# Patient Record
Sex: FEMALE | Race: WHITE | NOT HISPANIC OR LATINO | Employment: FULL TIME | URBAN - METROPOLITAN AREA
[De-identification: names, ages, dates, MRNs, and addresses within clinical notes are randomized per-mention and may not be internally consistent; named-entity substitution may affect disease eponyms.]

---

## 2017-06-09 ENCOUNTER — GENERIC CONVERSION - ENCOUNTER (OUTPATIENT)
Dept: OTHER | Facility: OTHER | Age: 42
End: 2017-06-09

## 2017-06-09 DIAGNOSIS — R92.8 OTHER ABNORMAL AND INCONCLUSIVE FINDINGS ON DIAGNOSTIC IMAGING OF BREAST: ICD-10-CM

## 2017-06-13 ENCOUNTER — GENERIC CONVERSION - ENCOUNTER (OUTPATIENT)
Dept: OTHER | Facility: OTHER | Age: 42
End: 2017-06-13

## 2017-12-09 DIAGNOSIS — R92.8 OTHER ABNORMAL AND INCONCLUSIVE FINDINGS ON DIAGNOSTIC IMAGING OF BREAST: ICD-10-CM

## 2018-01-05 ENCOUNTER — GENERIC CONVERSION - ENCOUNTER (OUTPATIENT)
Dept: OTHER | Facility: OTHER | Age: 43
End: 2018-01-05

## 2018-01-10 NOTE — MISCELLANEOUS
Message   Recorded as Task   Date: 12/12/2016 08:56 AM, Created By: Leia Seaman   Task Name: Call Back   Assigned To: Mary Niño   Regarding Patient: Thamas Bumpers, Status: In Progress   Comment:    Ida Sosa - 12 Dec 2016 8:56 AM     TASK CREATED  Mammo shows small bilateral nodules - needs bilateral ultrasound  Jeanette Ramos - 12 Dec 2016 9:06 AM     TASK IN PROGRESS   Jeanette Ramos - 12 Dec 2016 9:08 AM     TASK EDITED  [facility]   Jeanette Ramos - 13 Dec 2016 8:59 AM     TASK EDITED  [facility]   Jeanette Ramos - 13 Dec 2016 9:04 AM     TASK EDITED  Spoke with pt, stated she went for a bilateral u/s on 12/09  Will await for results from that and call her back with them  Active Problems    1  Depression (311) (F32 9)   2  Dysmenorrhea (625 3) (N94 6)   3  Encounter for gynecological examination without abnormal finding (V72 31) (Z01 419)   4  Encounter for screening mammogram for malignant neoplasm of breast (V76 12)   (Z12 31)   5  Menorrhagia (626 2) (N92 0)   6  Poison ivy (692 6) (L23 7)   7  Screening for human papillomavirus (HPV) (V73 81) (Z11 51)    Current Meds   1  TraMADol HCl - 50 MG Oral Tablet; Therapy: (Recorded:[id number]) to Recorded   2  Wellbutrin  MG Oral Tablet Extended Release 12 Hour (BuPROPion HCl ER   (SR)); TAKE 1 TABLET DAILY AS DIRECTED; Therapy: [id number] to Recorded    Allergies    1   No Known Drug Allergies    Signatures   Electronically signed by : Tiesha Lawrence, ; Dec 13 2016  9:05AM EST                       (Author)

## 2018-01-11 NOTE — MISCELLANEOUS
Message   Recorded as Task   Date: 12/13/2016 11:12 AM, Created By: Samantha Kelly   Task Name: Call Back   Assigned To: Kait Shanks   Regarding Patient: Eula Art, Status: In Progress   Comment:    Ida Sosa - 13 Dec 2016 11:12 AM     TASK CREATED  breast ultrasound shows bilateral cysts  Rec  is for 6 month follow-up ultrasound  Could also offer breast specialist opinion if she is interested  Jeanette Ramos - 13 Dec 2016 11:13 AM     TASK IN PROGRESS   Jeanette Ramos - 13 Dec 2016 11:20 AM     TASK EDITED  Made pt aware  Active Problems    1  Depression (311) (F32 9)   2  Dysmenorrhea (625 3) (N94 6)   3  Encounter for gynecological examination without abnormal finding (V72 31) (Z01 419)   4  Encounter for screening mammogram for malignant neoplasm of breast (V76 12)   (Z12 31)   5  Menorrhagia (626 2) (N92 0)   6  Poison ivy (692 6) (L23 7)   7  Screening for human papillomavirus (HPV) (V73 81) (Z11 51)    Current Meds   1  TraMADol HCl - 50 MG Oral Tablet; Therapy: (Recorded:70Bxm6873) to Recorded   2  Wellbutrin  MG Oral Tablet Extended Release 12 Hour (BuPROPion HCl ER   (SR)); TAKE 1 TABLET DAILY AS DIRECTED; Therapy: [id number] to Recorded    Allergies    1   No Known Drug Allergies    Signatures   Electronically signed by : Jeanie Andrews, ; Dec 13 2016 11:20AM EST                       (Author)

## 2018-01-13 NOTE — MISCELLANEOUS
Message   Recorded as Task   Date: 06/14/2017 07:14 AM, Created By: Katalina Salinas   Task Name: Call Back   Assigned To: Yue Mann   Regarding Patient: Zoila Nelson, Status: Active   CommentFredi Hof - 14 Jun 2017 7:14 AM     TASK CREATED  breast US shows cysts  They recommended repeat ultrasound in six months - please let her know and send her a script  Thanks! [facility] - 11 Jun 2017 7:38 AM     TASK REASSIGNED: Previously Assigned To Cate Hamlin - 14 Jun 2017 8:31 AM     TASK EDITED  lm on cell,mailed order        Active Problems    1  Depression (311) (F32 9)   2  Dysmenorrhea (625 3) (N94 6)   3  Encounter for gynecological examination without abnormal finding (V72 31) (Z01 419)   4  Encounter for screening mammogram for malignant neoplasm of breast (V76 12)   (Z12 31)   5  Mammogram abnormal (793 80) (R92 8)   6  Menorrhagia (626 2) (N92 0)   7  Poison ivy (692 6) (L23 7)   8  Screening for human papillomavirus (HPV) (V73 81) (Z11 51)    Current Meds   1  TraMADol HCl - 50 MG Oral Tablet; Therapy: (Recorded:37Tts6802) to Recorded   2  Wellbutrin  MG Oral Tablet Extended Release 12 Hour (BuPROPion HCl ER   (SR)); TAKE 1 TABLET DAILY AS DIRECTED; Therapy: [id number] to Recorded    Allergies    1   No Known Drug Allergies    Signatures   Electronically signed by : Matthew Coles, ; Jun 14 2017  8:31AM EST                       (Author)

## 2018-01-16 NOTE — MISCELLANEOUS
Message   Recorded as Task   Date: 06/09/2017 12:33 PM, Created By: Belem Bautista   Task Name: Follow Up   Assigned To: Angel Ferrara   Regarding Patient: Rody Leavitt, Status: In Progress   CommentMelody Nims - 09 Jun 2017 12:33 PM     TASK CREATED  Patient needs a script for Follow up Mammogram scheduled for today @ 3pm   It needs to be faxed to [phone]  this mammo is a follow up from 6 month ago screening  It will be on both breasts  Please task back to me and I will call her that it was faxed  Yue Mann - 09 Jun 2017 12:35 PM     TASK IN PROGRESS   Leonora Block - 09 Jun 2017 12:45 PM     TASK EDITED  verified w/ [facility] its breast us that pt needs,order faxed        Active Problems    1  Depression (311) (F32 9)   2  Dysmenorrhea (625 3) (N94 6)   3  Encounter for gynecological examination without abnormal finding (V72 31) (Z01 419)   4  Encounter for screening mammogram for malignant neoplasm of breast (V76 12)   (Z12 31)   5  Mammogram abnormal (793 80) (R92 8)   6  Menorrhagia (626 2) (N92 0)   7  Poison ivy (692 6) (L23 7)   8  Screening for human papillomavirus (HPV) (V73 81) (Z11 51)    Current Meds   1  TraMADol HCl - 50 MG Oral Tablet; Therapy: (Recorded:69Rxn1497) to Recorded   2  Wellbutrin  MG Oral Tablet Extended Release 12 Hour (BuPROPion HCl ER   (SR)); TAKE 1 TABLET DAILY AS DIRECTED; Therapy: [id number] to Recorded    Allergies    1  No Known Drug Allergies    Plan  Mammogram abnormal    · *US BREAST LEFT LIMITED (DIAGNOSTIC); Status:Hold For - Scheduling,Retrospective  By Protocol Authorization; Requested ROBERTO:[id number];    · *US BREAST RIGHT LIMITED (DIAGNOSTIC); Status:Hold For -  Scheduling,Retrospective By Protocol Authorization;  Requested LFK:[id number];     Signatures   Electronically signed by : Cynthia Hooper, ; Jun 9 2017 12:45PM EST                       (Author)

## 2018-01-23 NOTE — MISCELLANEOUS
Message   Recorded as Task   Date: 01/04/2018 09:04 AM, Created By: Gini Oates   Task Name: Care Coordination   Assigned To: Jesse Cooper   Regarding Patient: Steen Dancer, Status: In Progress   Comment:    Chen Burnham - 04 Jan 2018 9:04 AM     TASK CREATED  Caller: Self; Care Coordination; [phone] (Home); [phone] (Work)  pt needs rx for Ebbevegen 92,  she wants to have this done before her yearly apt 1/18/18,  PLS FAX TO subha [phone],  pts # [phone] if needed   Mable Thompson - 04 Jan 2018 9:23 AM     TASK EDITED  Pt has been trying to conceive for 8 mos  Has a 10 yo with present   Wanted lab work, etc prior to seeing you so that she can discuss with you at [facility]  Ida Mcguire - 04 Jan 2018 1:01 PM     TASK REPLIED TO: Previously Assigned To Ida Sosa  I would refer her directly to infertility for evaluation  Markus 91 - 04 Jan 2018 1:09 PM     TASK IN PROGRESS   Maureen Paulino - 04 Jan 2018 1:13 PM     TASK EDITED  Left voicemail message today @ [phone] for Pt to call back office  Jeanette Ramos - 05 Jan 2018 8:32 AM     TASK EDITED  Spoke with pt - she is aware of W87 recommendations about going to infertility clinic for evaluation  She will contact her insurance to inquire about it  Active Problems    1  Depression (311) (F32 9)   2  Dysmenorrhea (625 3) (N94 6)   3  Encounter for gynecological examination without abnormal finding (V72 31) (Z01 419)   4  Encounter for screening mammogram for malignant neoplasm of breast (V76 12)   (Z12 31)   5  Mammogram abnormal (793 80) (R92 8)   6  Menorrhagia (626 2) (N92 0)   7  Poison ivy (692 6) (L23 7)   8  Screening for human papillomavirus (HPV) (V73 81) (Z11 51)    Current Meds   1  TraMADol HCl - 50 MG Oral Tablet; Therapy: (Recorded:97Wnf8680) to Recorded   2  Wellbutrin  MG Oral Tablet Extended Release 12 Hour (BuPROPion HCl ER   (SR)); TAKE 1 TABLET DAILY AS DIRECTED;    Therapy: [id number] to Recorded    Allergies    1   No Known Drug Allergies    Signatures   Electronically signed by : Sarah Amor, ; Jan 5 2018  8:32AM EST                       (Author)

## 2019-08-15 PROBLEM — R19.4 CHANGE IN BOWEL HABITS: Status: ACTIVE | Noted: 2019-08-15

## 2019-09-17 PROBLEM — S02.2XXA CLOSED FRACTURE OF NASAL SEPTUM: Status: ACTIVE | Noted: 2019-09-17

## 2019-09-17 PROBLEM — S02.2XXA CLOSED FRACTURE OF NASAL BONES: Status: ACTIVE | Noted: 2019-09-17

## 2020-01-15 ENCOUNTER — OFFICE VISIT (OUTPATIENT)
Dept: OBGYN CLINIC | Facility: CLINIC | Age: 45
End: 2020-01-15
Payer: COMMERCIAL

## 2020-01-15 VITALS
DIASTOLIC BLOOD PRESSURE: 86 MMHG | BODY MASS INDEX: 22.19 KG/M2 | SYSTOLIC BLOOD PRESSURE: 132 MMHG | WEIGHT: 120.6 LBS | HEIGHT: 62 IN

## 2020-01-15 DIAGNOSIS — Z01.419 ROUTINE GYNECOLOGICAL EXAMINATION: Primary | ICD-10-CM

## 2020-01-15 DIAGNOSIS — R14.0 BLOATING: ICD-10-CM

## 2020-01-15 DIAGNOSIS — N92.0 MENORRHAGIA WITH REGULAR CYCLE: ICD-10-CM

## 2020-01-15 DIAGNOSIS — Z12.31 ENCOUNTER FOR SCREENING MAMMOGRAM FOR BREAST CANCER: ICD-10-CM

## 2020-01-15 PROBLEM — R92.8 MAMMOGRAM ABNORMAL: Status: ACTIVE | Noted: 2017-06-09

## 2020-01-15 PROCEDURE — G0145 SCR C/V CYTO,THINLAYER,RESCR: HCPCS | Performed by: PHYSICIAN ASSISTANT

## 2020-01-15 PROCEDURE — S0610 ANNUAL GYNECOLOGICAL EXAMINA: HCPCS | Performed by: PHYSICIAN ASSISTANT

## 2020-01-15 PROCEDURE — 87624 HPV HI-RISK TYP POOLED RSLT: CPT | Performed by: PHYSICIAN ASSISTANT

## 2020-01-15 NOTE — PROGRESS NOTES
Assessment/Plan   Problem List Items Addressed This Visit        Other    Menorrhagia    Relevant Orders    US pelvis complete w transvaginal    Routine gynecological examination - Primary     Pap guidelines reviewed  Pap with HPV done today  Script for mammogram given  Reviewed continued bloating and mildly enlarged uterus on exam, will plan to get pelvic ultrasound to further evaluate  Return to office for annual or as needed  Relevant Orders    Liquid-based pap, screening      Other Visit Diagnoses     Encounter for screening mammogram for breast cancer        Relevant Orders    Mammo screening bilateral w 3d & cad    Bloating        Relevant Orders    US pelvis complete w transvaginal          Subjective:     Patient ID: Romelia Douglas is a 40 y o  y o  female  HPI  41 yo seen for annual exam  Patient reports menses regular, heavy at times changing tampon every 3-4 hours on the heaviest days  Reports only lasts 3 days  Cramping tolerable, had been on Tramadol for it in the past, now just deals with it  Reports recently experiencing a lot of lower pelvic bloating, had colonoscopy which was normal, had diverticulitis soon after and was treated with antibiotics, caused yeast infection, treated OTC, symptoms improved  Still bloating, occasional LLQ pain, not relieved by bowel movement  Using contraceptive film for birth control, declines another method at this time  Denies bladder issues  Last pap: 5/12/2015 NILM (-)HRHPV  Patient reports hx of 1 abnormal pap in the past, normal since  Last mammogram: 12/6/2016 reports BIRADS 0, needed additional testing which came back negative  Last colonoscopy: 9/2019  The following portions of the patient's history were reviewed and updated as appropriate:   She  has a past medical history of Anxiety, Obsessive compulsive disorder, and PMDD (premenstrual dysphoric disorder)    She   Patient Active Problem List    Diagnosis Date Noted    Routine gynecological examination 01/15/2020    Closed fracture of nasal bones 2019    Closed fracture of nasal septum 2019    Change in bowel habits 08/15/2019    Mammogram abnormal 2017    Poison ivy 2014    Depression 2013    Dysmenorrhea 2013    Menorrhagia 2013     She  has a past surgical history that includes Ankle surgery; Cervical biopsy w/ loop electrode excision (); Colposcopy (2003); Mouth surgery; and Other surgical history  Her family history includes Coronary artery disease in her paternal grandmother; Heart disease in her paternal grandmother; Other in her father; Stroke in her paternal grandmother  She  reports that she has never smoked  She has never used smokeless tobacco  She reports that she drinks alcohol  She reports that she does not use drugs  Current Outpatient Medications   Medication Sig Dispense Refill    ALPRAZolam (XANAX) 0 5 mg tablet TK 1 T PO Q 8  H PRN  1    WELLBUTRIN  MG 24 hr tablet TK 1 T PO QD  5     No current facility-administered medications for this visit  She is allergic to iodinated diagnostic agents       Menstrual History:  OB History        1    Para   1    Term   1            AB        Living   1       SAB        TAB        Ectopic        Multiple        Live Births   1                Menarche age: 15  No LMP recorded  Period Cycle (Days): 24  Period Duration (Days): 3  Period Pattern: Regular  Menstrual Flow: Moderate  Dysmenorrhea: None      Review of Systems   Constitutional: Negative for fatigue, fever and unexpected weight change  HENT: Negative for dental problem and sinus pressure  Eyes: Negative for visual disturbance  Respiratory: Negative for cough, shortness of breath and wheezing  Cardiovascular: Negative for chest pain  Gastrointestinal: Negative for abdominal pain, blood in stool, constipation, diarrhea, nausea and vomiting     Endocrine: Negative for polydipsia  Genitourinary: Negative for difficulty urinating, dyspareunia, dysuria, frequency, hematuria, pelvic pain and urgency  Musculoskeletal: Negative for arthralgias and back pain  Neurological: Negative for dizziness, seizures, light-headedness and headaches  Psychiatric/Behavioral: Negative for suicidal ideas  The patient is not nervous/anxious  Objective:  Vitals:    01/15/20 1529   BP: 132/86   BP Location: Left arm   Patient Position: Sitting   Cuff Size: Standard   Weight: 54 7 kg (120 lb 9 6 oz)   Height: 5' 2" (1 575 m)      Physical Exam   Constitutional: She is oriented to person, place, and time  She appears well-developed and well-nourished  Genitourinary: Rectum normal and vagina normal  Pelvic exam was performed with patient supine  There is no rash, tenderness, lesion, injury or Bartholin's cyst on the right labia  There is no rash, tenderness, lesion, injury or Bartholin's cyst on the left labia  Vagina exhibits no lesion  No erythema, tenderness or bleeding in the vagina  No signs of injury around the vagina  No vaginal discharge found  Right adnexum does not display mass, does not display tenderness and does not display fullness  Left adnexum does not display mass, does not display tenderness and does not display fullness  Cervix does not exhibit motion tenderness, lesion or discharge  Uterus is enlarged (12-13 wk uterus)  Uterus is not tender, exhibiting a mass, irregular (is regular) or mobile  Rectal exam shows no external hemorrhoid, no internal hemorrhoid, no fissure, no mass, no tenderness, anal tone normal and guaiac negative stool  HENT:   Head: Normocephalic and atraumatic  Neck: No thyromegaly present  Cardiovascular: Normal rate, regular rhythm and normal heart sounds  Exam reveals no gallop and no friction rub  No murmur heard  Pulmonary/Chest: Effort normal and breath sounds normal  No respiratory distress  She has no wheezes   Right breast exhibits no inverted nipple, no mass, no nipple discharge, no skin change and no tenderness  Left breast exhibits no inverted nipple, no mass, no nipple discharge, no skin change and no tenderness  No breast swelling, tenderness, discharge or bleeding  Breasts are symmetrical    Abdominal: Soft  She exhibits no distension and no mass  There is no tenderness  There is no rebound and no guarding  No hernia  Lymphadenopathy:     She has no cervical adenopathy  Right: No inguinal adenopathy present  Left: No inguinal adenopathy present  Neurological: She is alert and oriented to person, place, and time  Skin: Skin is warm and dry  Psychiatric: She has a normal mood and affect   Her behavior is normal

## 2020-01-15 NOTE — ASSESSMENT & PLAN NOTE
Pap guidelines reviewed  Pap with HPV done today  Script for mammogram given  Reviewed continued bloating and mildly enlarged uterus on exam, will plan to get pelvic ultrasound to further evaluate  Return to office for annual or as needed

## 2020-01-16 LAB
HPV HR 12 DNA CVX QL NAA+PROBE: NEGATIVE
HPV16 DNA CVX QL NAA+PROBE: NEGATIVE
HPV18 DNA CVX QL NAA+PROBE: NEGATIVE

## 2020-01-20 LAB
LAB AP GYN PRIMARY INTERPRETATION: NORMAL
LAB AP LMP: NORMAL
Lab: NORMAL

## 2020-01-24 DIAGNOSIS — Z12.31 ENCOUNTER FOR SCREENING MAMMOGRAM FOR BREAST CANCER: ICD-10-CM

## 2020-01-28 ENCOUNTER — HOSPITAL ENCOUNTER (OUTPATIENT)
Dept: ULTRASOUND IMAGING | Facility: HOSPITAL | Age: 45
Discharge: HOME | End: 2020-01-28
Attending: PHYSICIAN ASSISTANT
Payer: COMMERCIAL

## 2020-01-28 DIAGNOSIS — N92.0 MENORRHAGIA WITH REGULAR CYCLE: ICD-10-CM

## 2020-01-28 DIAGNOSIS — R14.0 BLOATING: ICD-10-CM

## 2020-01-28 PROCEDURE — 76830 TRANSVAGINAL US NON-OB: CPT

## 2020-01-28 PROCEDURE — 76856 US EXAM PELVIC COMPLETE: CPT

## 2020-02-04 ENCOUNTER — TELEPHONE (OUTPATIENT)
Dept: OBGYN CLINIC | Facility: CLINIC | Age: 45
End: 2020-02-04

## 2020-02-04 DIAGNOSIS — D25.2 INTRAMURAL AND SUBSEROUS LEIOMYOMA OF UTERUS: Primary | ICD-10-CM

## 2020-02-04 DIAGNOSIS — D25.1 INTRAMURAL AND SUBSEROUS LEIOMYOMA OF UTERUS: Primary | ICD-10-CM

## 2020-02-19 ENCOUNTER — TELEPHONE (OUTPATIENT)
Dept: OBGYN CLINIC | Facility: CLINIC | Age: 45
End: 2020-02-19

## 2020-02-19 DIAGNOSIS — B37.3 YEAST VAGINITIS: Primary | ICD-10-CM

## 2020-02-19 RX ORDER — FLUCONAZOLE 150 MG/1
150 TABLET ORAL EVERY OTHER DAY
Qty: 2 TABLET | Refills: 0 | Status: SHIPPED | OUTPATIENT
Start: 2020-02-19 | End: 2020-02-22

## 2021-07-21 ENCOUNTER — ANNUAL EXAM (OUTPATIENT)
Dept: OBGYN CLINIC | Facility: CLINIC | Age: 46
End: 2021-07-21
Payer: COMMERCIAL

## 2021-07-21 VITALS
SYSTOLIC BLOOD PRESSURE: 108 MMHG | BODY MASS INDEX: 21.35 KG/M2 | HEIGHT: 62 IN | WEIGHT: 116 LBS | DIASTOLIC BLOOD PRESSURE: 74 MMHG

## 2021-07-21 DIAGNOSIS — Z01.419 GYNECOLOGIC EXAM NORMAL: Primary | ICD-10-CM

## 2021-07-21 DIAGNOSIS — Z12.31 ENCOUNTER FOR SCREENING MAMMOGRAM FOR MALIGNANT NEOPLASM OF BREAST: ICD-10-CM

## 2021-07-21 DIAGNOSIS — B37.9 YEAST INFECTION: ICD-10-CM

## 2021-07-21 DIAGNOSIS — Z13.220 SCREENING FOR CHOLESTEROL LEVEL: ICD-10-CM

## 2021-07-21 DIAGNOSIS — D25.9 UTERINE LEIOMYOMA, UNSPECIFIED LOCATION: ICD-10-CM

## 2021-07-21 DIAGNOSIS — N92.0 MENORRHAGIA WITH REGULAR CYCLE: ICD-10-CM

## 2021-07-21 DIAGNOSIS — R53.83 FATIGUE, UNSPECIFIED TYPE: ICD-10-CM

## 2021-07-21 DIAGNOSIS — N92.6 IRREGULAR MENSES: ICD-10-CM

## 2021-07-21 PROCEDURE — S0612 ANNUAL GYNECOLOGICAL EXAMINA: HCPCS | Performed by: PHYSICIAN ASSISTANT

## 2021-07-21 RX ORDER — FLUCONAZOLE 150 MG/1
150 TABLET ORAL EVERY OTHER DAY
Qty: 2 TABLET | Refills: 0 | Status: SHIPPED | OUTPATIENT
Start: 2021-07-21 | End: 2021-07-24

## 2021-07-21 NOTE — ASSESSMENT & PLAN NOTE
Pap guidelines reviewed  Pap deferred secondary to negative pap and HPV in 2020 in this low risk patient  Reviewed dysmenorrhea and menorrhagia in length  Will plan to get repeat ultrasound to evaluate uterine fibroids  Will also plan blood work  Reviewed option of hysterectomy  Patient will consider  Reviewed persistent yeast infection  Likely caused by wet swim suits and increased sugar in diet  Will plan Diflucan 150mg x 2 doses  For prevention: Recommend acidophilus or yogurt daily, avoid irritating soaps or lotions, no tight fitted pants or underwear, avoid bubble baths, do not stay in wet swimsuit  Script for mammogram given  Return to office for annual or as needed

## 2021-08-04 ENCOUNTER — TELEPHONE (OUTPATIENT)
Dept: OBGYN CLINIC | Facility: CLINIC | Age: 46
End: 2021-08-04

## 2021-08-06 ENCOUNTER — HOSPITAL ENCOUNTER (OUTPATIENT)
Dept: ULTRASOUND IMAGING | Facility: HOSPITAL | Age: 46
Discharge: HOME | End: 2021-08-06
Attending: PHYSICIAN ASSISTANT
Payer: COMMERCIAL

## 2021-08-06 DIAGNOSIS — D25.9 UTERINE LEIOMYOMA, UNSPECIFIED LOCATION: ICD-10-CM

## 2021-08-06 PROCEDURE — 76856 US EXAM PELVIC COMPLETE: CPT

## 2021-08-06 PROCEDURE — 76830 TRANSVAGINAL US NON-OB: CPT

## 2021-08-07 LAB
ALBUMIN SERPL-MCNC: 4.3 G/DL (ref 3.6–5.1)
ALBUMIN/GLOB SERPL: 1.9 (CALC) (ref 1–2.5)
ALP SERPL-CCNC: 48 U/L (ref 31–125)
ALT SERPL-CCNC: 12 U/L (ref 6–29)
AST SERPL-CCNC: 13 U/L (ref 10–35)
BASOPHILS # BLD AUTO: 62 CELLS/UL (ref 0–200)
BASOPHILS NFR BLD AUTO: 2.2 %
BILIRUB SERPL-MCNC: 0.8 MG/DL (ref 0.2–1.2)
BUN SERPL-MCNC: 11 MG/DL (ref 7–25)
BUN/CREAT SERPL: NORMAL (CALC) (ref 6–22)
CALCIUM SERPL-MCNC: 9.3 MG/DL (ref 8.6–10.2)
CHLORIDE SERPL-SCNC: 102 MMOL/L (ref 98–110)
CHOLEST SERPL-MCNC: 195 MG/DL
CHOLEST/HDLC SERPL: 2.5 (CALC)
CO2 SERPL-SCNC: 28 MMOL/L (ref 20–32)
CREAT SERPL-MCNC: 0.66 MG/DL (ref 0.5–1.1)
EOSINOPHIL # BLD AUTO: 232 CELLS/UL (ref 15–500)
EOSINOPHIL NFR BLD AUTO: 8.3 %
ERYTHROCYTE [DISTWIDTH] IN BLOOD BY AUTOMATED COUNT: 12.6 % (ref 11–15)
GLOBULIN SER CALC-MCNC: 2.3 G/DL (CALC) (ref 1.9–3.7)
GLUCOSE SERPL-MCNC: 78 MG/DL (ref 65–99)
HCT VFR BLD AUTO: 42.9 % (ref 35–45)
HDLC SERPL-MCNC: 78 MG/DL
HGB BLD-MCNC: 13.8 G/DL (ref 11.7–15.5)
LDLC SERPL CALC-MCNC: 103 MG/DL (CALC)
LYMPHOCYTES # BLD AUTO: 994 CELLS/UL (ref 850–3900)
LYMPHOCYTES NFR BLD AUTO: 35.5 %
MCH RBC QN AUTO: 32.2 PG (ref 27–33)
MCHC RBC AUTO-ENTMCNC: 32.2 G/DL (ref 32–36)
MCV RBC AUTO: 100.2 FL (ref 80–100)
MONOCYTES # BLD AUTO: 224 CELLS/UL (ref 200–950)
MONOCYTES NFR BLD AUTO: 8 %
NEUTROPHILS # BLD AUTO: 1288 CELLS/UL (ref 1500–7800)
NEUTROPHILS NFR BLD AUTO: 46 %
NONHDLC SERPL-MCNC: 117 MG/DL (CALC)
PLATELET # BLD AUTO: 207 THOUSAND/UL (ref 140–400)
PMV BLD REES-ECKER: 11.6 FL (ref 7.5–12.5)
POTASSIUM SERPL-SCNC: 4.5 MMOL/L (ref 3.5–5.3)
PROT SERPL-MCNC: 6.6 G/DL (ref 6.1–8.1)
RBC # BLD AUTO: 4.28 MILLION/UL (ref 3.8–5.1)
REF LAB TEST NAME: NORMAL
REF LAB TEST: NORMAL
SL AMB CLIENT CONTACT: NORMAL
SL AMB EGFR AFRICAN AMERICAN: 124 ML/MIN/1.73M2
SL AMB EGFR NON AFRICAN AMERICAN: 107 ML/MIN/1.73M2
SODIUM SERPL-SCNC: 139 MMOL/L (ref 135–146)
T4 FREE SERPL-MCNC: 1.2 NG/DL (ref 0.8–1.8)
TRIGL SERPL-MCNC: 59 MG/DL
TSH SERPL-ACNC: 1.56 MIU/L
WBC # BLD AUTO: 2.8 THOUSAND/UL (ref 3.8–10.8)

## 2021-11-01 ENCOUNTER — OFFICE VISIT (OUTPATIENT)
Dept: OBGYN CLINIC | Facility: CLINIC | Age: 46
End: 2021-11-01
Payer: COMMERCIAL

## 2021-11-01 VITALS
SYSTOLIC BLOOD PRESSURE: 112 MMHG | HEIGHT: 62 IN | BODY MASS INDEX: 21.9 KG/M2 | WEIGHT: 119 LBS | DIASTOLIC BLOOD PRESSURE: 76 MMHG

## 2021-11-01 DIAGNOSIS — D25.1 INTRAMURAL LEIOMYOMA OF UTERUS: Primary | ICD-10-CM

## 2021-11-01 PROCEDURE — 99213 OFFICE O/P EST LOW 20 MIN: CPT | Performed by: OBSTETRICS & GYNECOLOGY

## 2021-12-15 ENCOUNTER — TELEPHONE (OUTPATIENT)
Dept: OBGYN CLINIC | Facility: CLINIC | Age: 46
End: 2021-12-15

## 2021-12-21 ENCOUNTER — TELEPHONE (OUTPATIENT)
Dept: OBGYN CLINIC | Facility: CLINIC | Age: 46
End: 2021-12-21

## 2021-12-23 ENCOUNTER — PREP FOR PROCEDURE (OUTPATIENT)
Dept: OBGYN CLINIC | Facility: CLINIC | Age: 46
End: 2021-12-23

## 2021-12-23 DIAGNOSIS — D25.9 FIBROID UTERUS: Primary | ICD-10-CM

## 2021-12-23 DIAGNOSIS — R10.2 PELVIC PAIN IN FEMALE: ICD-10-CM

## 2022-01-13 LAB
ABO GROUP BLD: NORMAL
BASOPHILS # BLD AUTO: 78 CELLS/UL (ref 0–200)
BASOPHILS NFR BLD AUTO: 1.7 %
BLD GP AB SCN SERPL QL: NORMAL
EOSINOPHIL # BLD AUTO: 239 CELLS/UL (ref 15–500)
EOSINOPHIL NFR BLD AUTO: 5.2 %
ERYTHROCYTE [DISTWIDTH] IN BLOOD BY AUTOMATED COUNT: 12.2 % (ref 11–15)
HCT VFR BLD AUTO: 38.4 % (ref 35–45)
HGB BLD-MCNC: 13.1 G/DL (ref 11.7–15.5)
LYMPHOCYTES # BLD AUTO: 1104 CELLS/UL (ref 850–3900)
LYMPHOCYTES NFR BLD AUTO: 24 %
MCH RBC QN AUTO: 31.3 PG (ref 27–33)
MCHC RBC AUTO-ENTMCNC: 34.1 G/DL (ref 32–36)
MCV RBC AUTO: 91.6 FL (ref 80–100)
MONOCYTES # BLD AUTO: 331 CELLS/UL (ref 200–950)
MONOCYTES NFR BLD AUTO: 7.2 %
NEUTROPHILS # BLD AUTO: 2847 CELLS/UL (ref 1500–7800)
NEUTROPHILS NFR BLD AUTO: 61.9 %
PLATELET # BLD AUTO: 212 THOUSAND/UL (ref 140–400)
PMV BLD REES-ECKER: 12.3 FL (ref 7.5–12.5)
RBC # BLD AUTO: 4.19 MILLION/UL (ref 3.8–5.1)
RH BLD: NORMAL
WBC # BLD AUTO: 4.6 THOUSAND/UL (ref 3.8–10.8)

## 2022-02-03 NOTE — PRE-PROCEDURE INSTRUCTIONS
My Surgical Experience    The following information was developed to assist you to prepare for your operation  What do I need to do before coming to the hospital?   Arrange for a responsible person to drive you to and from the hospital    Arrange care for your children at home  Children are not allowed in the recovery areas of the hospital   Plan to wear clothing that is easy to put on and take off  If you are having shoulder surgery, wear a shirt that buttons or zippers in the front  Bathing  o Shower the evening before and the morning of your surgery with an antibacterial soap  Please refer to the Pre Op Showering Instructions for Surgery Patients Sheet   o Remove nail polish and all body piercing jewelry  o Do not shave any body part for at least 24 hours before surgery-this includes face, arms, legs and upper body  Food  o Nothing to eat or drink after midnight the night before your surgery  This includes candy and chewing gum  o Exception: If your surgery is after 12:00pm (noon), you may have clear liquids such as 7-Up®, ginger ale, apple or cranberry juice, Jell-O®, water, or clear broth until 8:00 am  o Do not drink milk or juice with pulp on the morning before surgery  o Do not drink alcohol 24 hours before surgery  Medicine  o Follow instructions you received from your surgeon about which medicines you may take on the day of surgery  o If instructed to take medicine on the morning of surgery, take pills with just a small sip of water  Call your prescribing doctor for specific infroamtion on what to do if you take insulin    What should I bring to the hospital?    Bring:  Nany Tirado or a walker, if you have them, for foot or knee surgery   A list of the daily medicines, vitamins, minerals, herbals and nutritional supplements you take   Include the dosages of medicines and the time you take them each day   Glasses, dentures or hearing aids   Minimal clothing; you will be wearing hospital sleepwear   Photo ID; required to verify your identity   If you have a Living Will or Power of , bring a copy of the documents   If you have an ostomy, bring an extra pouch and any supplies you use    Do not bring   Medicines or inhalers   Money, valuables or jewelry    What other information should I know about the day of surgery?  Notify your surgeons if you develop a cold, sore throat, cough, fever, rash or any other illness   Report to the Ambulatory Surgical/Same Day Surgery Unit   You will be instructed to stop at Registration only if you have not been pre-registered   Inform your  fi they do not stay that they will be asked by the staff to leave a phone number where they can be reached   Be available to be reached before surgery  In the event the operating room schedule changes, you may be asked to come in earlier or later than expected    *It is important to tell your doctor and others involved in your health care if you are taking or have been taking any non-prescription drugs, vitamins, minerals, herbals or other nutritional supplements  Any of these may interact with some food or medicines and cause a reaction      Pre-Surgery Instructions:   Medication Instructions    ALPRAZolam (XANAX) 0 5 mg tablet Instructed patient per Anesthesia Guidelines   WELLBUTRIN  MG 24 hr tablet Instructed patient per Anesthesia Guidelines  To take wellbutrin and xanax(prn) a m  of surgery

## 2022-02-04 PROCEDURE — NC001 PR NO CHARGE: Performed by: OBSTETRICS & GYNECOLOGY

## 2022-02-04 NOTE — H&P
Jimmy Kendrick is a 27-year-old  with regular heavy menses and severe dysmenorrhea for at least the last 3 years  Patient has had also had increased pelvic pressure increased frequency of emptying bladder  Fully counseled patient to laparoscopic-assisted vaginal hysterectomy bilateral salpingectomy reviewed surgery, expected course, risks and benefits, surgical instructions  Patient reports understanding consent desires to proceed  Gyn history  Contraception:  Vasectomy  Last Pap 2020 normal  Last mammogram 2021 normal    Past Medical History:   Diagnosis Date    Abnormal Pap smear of cervix     Anxiety     Depression     Fibroid     Obsessive compulsive disorder     RESOLVED 14    PMDD (premenstrual dysphoric disorder)      Past Surgical History:   Procedure Laterality Date    ANKLE SURGERY      CERVICAL BIOPSY  W/ LOOP ELECTRODE EXCISION      COLPOSCOPY  2003    CERVIX WITH BIOPSY(S)    FRACTURE SURGERY      MOUTH SURGERY      TOOTH EXTRACTION    OTHER SURGICAL HISTORY      ENDOCERVICAL CURETTAGE (NOT D&C)     OB History        1    Para   1    Term   1            AB        Living   1       SAB        IAB        Ectopic        Multiple        Live Births   1               Current Outpatient Medications   Medication Instructions    ALPRAZolam (XANAX) 0 5 mg tablet TK 1 T PO Q 8  H PRN    WELLBUTRIN  MG 24 hr tablet TK 1 T PO QD     Allergies   Allergen Reactions    Iodinated Diagnostic Agents      Social History     Tobacco Use    Smoking status: Never Smoker    Smokeless tobacco: Never Used   Vaping Use    Vaping Use: Never used   Substance Use Topics    Alcohol use:  Yes     Alcohol/week: 4 0 standard drinks     Types: 4 Cans of beer per week     Comment: SOCIAL    Drug use: Never     Physical exam  Height 5 ft 2 in, weight 119 lb (54 kg), BMI 21 77, /76  Head:  Normocephalic atraumatic,  Lungs:  Clear to auscultation bilaterally  Heart:  Regular rate rhythm S1-S2  Abdomen:  Soft nontender positive bowel sounds organomegaly  Pelvic:  Normal external female genitalia, normal vagina without discharge, no cervical motion tenderness, no adnexal masses or tenderness, 14 week size fibroid uterus mobile palpable slightly above pelvic brim  Extremities:  Normal warm well perfused no cyanosis clubbing or edema    Assessment:  43-year-old  with symptomatic fibroid uterus heavy menses and pressure symptoms      Plan LAVH bilateral salpingectomy

## 2022-02-08 ENCOUNTER — ANESTHESIA EVENT (OUTPATIENT)
Dept: PERIOP | Facility: HOSPITAL | Age: 47
End: 2022-02-08
Payer: COMMERCIAL

## 2022-02-09 ENCOUNTER — ANESTHESIA (OUTPATIENT)
Dept: PERIOP | Facility: HOSPITAL | Age: 47
End: 2022-02-09
Payer: COMMERCIAL

## 2022-02-09 ENCOUNTER — HOSPITAL ENCOUNTER (OUTPATIENT)
Facility: HOSPITAL | Age: 47
Setting detail: OUTPATIENT SURGERY
Discharge: HOME | End: 2022-02-09
Attending: OBSTETRICS & GYNECOLOGY | Admitting: OBSTETRICS & GYNECOLOGY
Payer: COMMERCIAL

## 2022-02-09 VITALS
SYSTOLIC BLOOD PRESSURE: 135 MMHG | OXYGEN SATURATION: 98 % | RESPIRATION RATE: 18 BRPM | WEIGHT: 113 LBS | TEMPERATURE: 97.9 F | HEIGHT: 62 IN | DIASTOLIC BLOOD PRESSURE: 63 MMHG | HEART RATE: 67 BPM | BODY MASS INDEX: 20.8 KG/M2

## 2022-02-09 DIAGNOSIS — D25.9 FIBROID UTERUS: ICD-10-CM

## 2022-02-09 DIAGNOSIS — R10.2 PELVIC PAIN IN FEMALE: ICD-10-CM

## 2022-02-09 LAB
EXT PREGNANCY TEST URINE: NEGATIVE
EXT. CONTROL: NORMAL

## 2022-02-09 PROCEDURE — 88307 TISSUE EXAM BY PATHOLOGIST: CPT | Performed by: PATHOLOGY

## 2022-02-09 PROCEDURE — 81025 URINE PREGNANCY TEST: CPT | Performed by: ANESTHESIOLOGY

## 2022-02-09 PROCEDURE — 58554 LAPARO-VAG HYST W/T/O COMPL: CPT | Performed by: OBSTETRICS & GYNECOLOGY

## 2022-02-09 RX ORDER — MIDAZOLAM HYDROCHLORIDE 2 MG/2ML
INJECTION, SOLUTION INTRAMUSCULAR; INTRAVENOUS AS NEEDED
Status: DISCONTINUED | OUTPATIENT
Start: 2022-02-09 | End: 2022-02-09

## 2022-02-09 RX ORDER — SODIUM CHLORIDE, SODIUM LACTATE, POTASSIUM CHLORIDE, CALCIUM CHLORIDE 600; 310; 30; 20 MG/100ML; MG/100ML; MG/100ML; MG/100ML
125 INJECTION, SOLUTION INTRAVENOUS CONTINUOUS
Status: DISCONTINUED | OUTPATIENT
Start: 2022-02-09 | End: 2022-02-09 | Stop reason: HOSPADM

## 2022-02-09 RX ORDER — ONDANSETRON 2 MG/ML
4 INJECTION INTRAMUSCULAR; INTRAVENOUS EVERY 6 HOURS PRN
Status: DISCONTINUED | OUTPATIENT
Start: 2022-02-09 | End: 2022-02-09 | Stop reason: HOSPADM

## 2022-02-09 RX ORDER — MAGNESIUM HYDROXIDE 1200 MG/15ML
LIQUID ORAL AS NEEDED
Status: DISCONTINUED | OUTPATIENT
Start: 2022-02-09 | End: 2022-02-09 | Stop reason: HOSPADM

## 2022-02-09 RX ORDER — ONDANSETRON 2 MG/ML
INJECTION INTRAMUSCULAR; INTRAVENOUS AS NEEDED
Status: DISCONTINUED | OUTPATIENT
Start: 2022-02-09 | End: 2022-02-09

## 2022-02-09 RX ORDER — METRONIDAZOLE 7.5 MG/G
GEL VAGINAL AS NEEDED
Status: DISCONTINUED | OUTPATIENT
Start: 2022-02-09 | End: 2022-02-09 | Stop reason: HOSPADM

## 2022-02-09 RX ORDER — DEXAMETHASONE SODIUM PHOSPHATE 4 MG/ML
INJECTION, SOLUTION INTRA-ARTICULAR; INTRALESIONAL; INTRAMUSCULAR; INTRAVENOUS; SOFT TISSUE AS NEEDED
Status: DISCONTINUED | OUTPATIENT
Start: 2022-02-09 | End: 2022-02-09

## 2022-02-09 RX ORDER — CEFAZOLIN SODIUM 1 G/50ML
1000 SOLUTION INTRAVENOUS ONCE
Status: DISCONTINUED | OUTPATIENT
Start: 2022-02-09 | End: 2022-02-09 | Stop reason: HOSPADM

## 2022-02-09 RX ORDER — ONDANSETRON 2 MG/ML
4 INJECTION INTRAMUSCULAR; INTRAVENOUS ONCE AS NEEDED
Status: DISCONTINUED | OUTPATIENT
Start: 2022-02-09 | End: 2022-02-09 | Stop reason: HOSPADM

## 2022-02-09 RX ORDER — ACETAMINOPHEN 325 MG/1
975 TABLET ORAL EVERY 6 HOURS PRN
Status: DISCONTINUED | OUTPATIENT
Start: 2022-02-09 | End: 2022-02-09 | Stop reason: HOSPADM

## 2022-02-09 RX ORDER — SODIUM CHLORIDE, SODIUM LACTATE, POTASSIUM CHLORIDE, CALCIUM CHLORIDE 600; 310; 30; 20 MG/100ML; MG/100ML; MG/100ML; MG/100ML
125 INJECTION, SOLUTION INTRAVENOUS CONTINUOUS
Status: DISCONTINUED | OUTPATIENT
Start: 2022-02-09 | End: 2022-02-09

## 2022-02-09 RX ORDER — OXYCODONE HYDROCHLORIDE 5 MG/1
5 TABLET ORAL EVERY 4 HOURS PRN
Status: DISCONTINUED | OUTPATIENT
Start: 2022-02-09 | End: 2022-02-09 | Stop reason: HOSPADM

## 2022-02-09 RX ORDER — FENTANYL CITRATE 50 UG/ML
INJECTION, SOLUTION INTRAMUSCULAR; INTRAVENOUS AS NEEDED
Status: DISCONTINUED | OUTPATIENT
Start: 2022-02-09 | End: 2022-02-09

## 2022-02-09 RX ORDER — EPHEDRINE SULFATE 50 MG/ML
INJECTION INTRAVENOUS AS NEEDED
Status: DISCONTINUED | OUTPATIENT
Start: 2022-02-09 | End: 2022-02-09

## 2022-02-09 RX ORDER — IBUPROFEN 600 MG/1
600 TABLET ORAL EVERY 6 HOURS PRN
Status: DISCONTINUED | OUTPATIENT
Start: 2022-02-09 | End: 2022-02-09 | Stop reason: HOSPADM

## 2022-02-09 RX ORDER — CEFAZOLIN SODIUM 1 G/50ML
SOLUTION INTRAVENOUS AS NEEDED
Status: DISCONTINUED | OUTPATIENT
Start: 2022-02-09 | End: 2022-02-09

## 2022-02-09 RX ORDER — SODIUM CHLORIDE 9 MG/ML
INJECTION, SOLUTION INTRAVENOUS AS NEEDED
Status: DISCONTINUED | OUTPATIENT
Start: 2022-02-09 | End: 2022-02-09 | Stop reason: HOSPADM

## 2022-02-09 RX ORDER — HYDROMORPHONE HCL/PF 1 MG/ML
SYRINGE (ML) INJECTION AS NEEDED
Status: DISCONTINUED | OUTPATIENT
Start: 2022-02-09 | End: 2022-02-09

## 2022-02-09 RX ORDER — LIDOCAINE HYDROCHLORIDE 10 MG/ML
INJECTION, SOLUTION EPIDURAL; INFILTRATION; INTRACAUDAL; PERINEURAL AS NEEDED
Status: DISCONTINUED | OUTPATIENT
Start: 2022-02-09 | End: 2022-02-09

## 2022-02-09 RX ORDER — GLYCOPYRROLATE 0.2 MG/ML
INJECTION INTRAMUSCULAR; INTRAVENOUS AS NEEDED
Status: DISCONTINUED | OUTPATIENT
Start: 2022-02-09 | End: 2022-02-09

## 2022-02-09 RX ORDER — BUPIVACAINE HYDROCHLORIDE 2.5 MG/ML
INJECTION, SOLUTION EPIDURAL; INFILTRATION; INTRACAUDAL AS NEEDED
Status: DISCONTINUED | OUTPATIENT
Start: 2022-02-09 | End: 2022-02-09 | Stop reason: HOSPADM

## 2022-02-09 RX ORDER — SODIUM CHLORIDE 9 MG/ML
INJECTION, SOLUTION INTRAVENOUS CONTINUOUS PRN
Status: DISCONTINUED | OUTPATIENT
Start: 2022-02-09 | End: 2022-02-09

## 2022-02-09 RX ORDER — ROCURONIUM BROMIDE 10 MG/ML
INJECTION, SOLUTION INTRAVENOUS AS NEEDED
Status: DISCONTINUED | OUTPATIENT
Start: 2022-02-09 | End: 2022-02-09

## 2022-02-09 RX ORDER — OXYCODONE HYDROCHLORIDE 5 MG/1
10 TABLET ORAL EVERY 4 HOURS PRN
Status: DISCONTINUED | OUTPATIENT
Start: 2022-02-09 | End: 2022-02-09 | Stop reason: HOSPADM

## 2022-02-09 RX ORDER — KETOROLAC TROMETHAMINE 30 MG/ML
INJECTION, SOLUTION INTRAMUSCULAR; INTRAVENOUS AS NEEDED
Status: DISCONTINUED | OUTPATIENT
Start: 2022-02-09 | End: 2022-02-09

## 2022-02-09 RX ORDER — FENTANYL CITRATE/PF 50 MCG/ML
25 SYRINGE (ML) INJECTION
Status: DISCONTINUED | OUTPATIENT
Start: 2022-02-09 | End: 2022-02-09 | Stop reason: HOSPADM

## 2022-02-09 RX ORDER — PROPOFOL 10 MG/ML
INJECTION, EMULSION INTRAVENOUS AS NEEDED
Status: DISCONTINUED | OUTPATIENT
Start: 2022-02-09 | End: 2022-02-09

## 2022-02-09 RX ADMIN — FENTANYL CITRATE 25 MCG: 50 INJECTION, SOLUTION INTRAMUSCULAR; INTRAVENOUS at 08:58

## 2022-02-09 RX ADMIN — ROCURONIUM BROMIDE 50 MG: 10 INJECTION, SOLUTION INTRAVENOUS at 08:30

## 2022-02-09 RX ADMIN — KETOROLAC TROMETHAMINE 30 MG: 30 INJECTION, SOLUTION INTRAMUSCULAR at 11:26

## 2022-02-09 RX ADMIN — ROCURONIUM BROMIDE 10 MG: 10 INJECTION, SOLUTION INTRAVENOUS at 09:50

## 2022-02-09 RX ADMIN — GLYCOPYRROLATE 0.2 MG: 0.2 INJECTION, SOLUTION INTRAMUSCULAR; INTRAVENOUS at 09:09

## 2022-02-09 RX ADMIN — MIDAZOLAM 2 MG: 1 INJECTION INTRAMUSCULAR; INTRAVENOUS at 08:20

## 2022-02-09 RX ADMIN — SODIUM CHLORIDE, SODIUM LACTATE, POTASSIUM CHLORIDE, AND CALCIUM CHLORIDE 125 ML/HR: .6; .31; .03; .02 INJECTION, SOLUTION INTRAVENOUS at 07:57

## 2022-02-09 RX ADMIN — ROCURONIUM BROMIDE 10 MG: 10 INJECTION, SOLUTION INTRAVENOUS at 08:46

## 2022-02-09 RX ADMIN — FENTANYL CITRATE 25 MCG: 50 INJECTION INTRAMUSCULAR; INTRAVENOUS at 11:57

## 2022-02-09 RX ADMIN — EPHEDRINE SULFATE 5 MG: 50 INJECTION, SOLUTION INTRAVENOUS at 09:09

## 2022-02-09 RX ADMIN — ONDANSETRON 4 MG: 2 INJECTION INTRAMUSCULAR; INTRAVENOUS at 11:17

## 2022-02-09 RX ADMIN — ROCURONIUM BROMIDE 10 MG: 10 INJECTION, SOLUTION INTRAVENOUS at 09:11

## 2022-02-09 RX ADMIN — FENTANYL CITRATE 50 MCG: 50 INJECTION, SOLUTION INTRAMUSCULAR; INTRAVENOUS at 08:27

## 2022-02-09 RX ADMIN — SUGAMMADEX 200 MG: 100 INJECTION, SOLUTION INTRAVENOUS at 11:22

## 2022-02-09 RX ADMIN — HYDROMORPHONE HYDROCHLORIDE 1 MG: 1 INJECTION, SOLUTION INTRAMUSCULAR; INTRAVENOUS; SUBCUTANEOUS at 09:22

## 2022-02-09 RX ADMIN — LIDOCAINE HYDROCHLORIDE 50 MG: 10 INJECTION, SOLUTION EPIDURAL; INFILTRATION; INTRACAUDAL; PERINEURAL at 08:28

## 2022-02-09 RX ADMIN — FENTANYL CITRATE 25 MCG: 50 INJECTION, SOLUTION INTRAMUSCULAR; INTRAVENOUS at 08:59

## 2022-02-09 RX ADMIN — FENTANYL CITRATE 25 MCG: 50 INJECTION INTRAMUSCULAR; INTRAVENOUS at 11:51

## 2022-02-09 RX ADMIN — PROPOFOL 150 MG: 10 INJECTION, EMULSION INTRAVENOUS at 08:29

## 2022-02-09 RX ADMIN — DEXAMETHASONE SODIUM PHOSPHATE 4 MG: 4 INJECTION, SOLUTION INTRA-ARTICULAR; INTRALESIONAL; INTRAMUSCULAR; INTRAVENOUS; SOFT TISSUE at 08:35

## 2022-02-09 RX ADMIN — SODIUM CHLORIDE, SODIUM LACTATE, POTASSIUM CHLORIDE, AND CALCIUM CHLORIDE: .6; .31; .03; .02 INJECTION, SOLUTION INTRAVENOUS at 10:53

## 2022-02-09 RX ADMIN — SODIUM CHLORIDE: 0.9 INJECTION, SOLUTION INTRAVENOUS at 08:32

## 2022-02-09 RX ADMIN — CEFAZOLIN SODIUM 1000 MG: 1 SOLUTION INTRAVENOUS at 08:22

## 2022-02-09 NOTE — ANESTHESIA PREPROCEDURE EVALUATION
Procedure:  HYSTERECTOMY LAPAROSCOPIC ASSISTED VAGINAL (LAVH) (N/A Abdomen)  SALPINGECTOMY, LAPAROSCOPIC (Bilateral Abdomen)    Relevant Problems   NEURO/PSYCH   (+) Depression        Physical Exam    Airway    Mallampati score: II  TM Distance: >3 FB  Neck ROM: full     Dental   No notable dental hx     Cardiovascular  Rate: normal, Cardiovascular exam normal    Pulmonary  Pulmonary exam normal Breath sounds clear to auscultation,     Other Findings        Anesthesia Plan  ASA Score- 2     Anesthesia Type- general with ASA Monitors  Additional Monitors:   Airway Plan: ETT  Plan Factors-Exercise tolerance (METS): >4 METS  Chart reviewed  Existing labs reviewed  Patient summary reviewed  Patient is not a current smoker  Induction- intravenous  Postoperative Plan- Plan for postoperative opioid use  Informed Consent- Anesthetic plan and risks discussed with patient  I personally reviewed this patient with the CRNA  Discussed and agreed on the Anesthesia Plan with the CRNA  Kenneth Santana

## 2022-02-09 NOTE — DISCHARGE INSTRUCTIONS
Laparoscopically Assisted Vaginal Hysterectomy   WHAT YOU SHOULD KNOW:   Laparoscopically assisted vaginal hysterectomy (LAVH) is surgery to remove your uterus  Other organs, such as your ovaries and fallopian tubes, may also be removed  AFTER YOU LEAVE:   Medicines:   · NSAIDs  help decrease swelling, pain, and fever  This medicine is available with or without a doctor's order  NSAIDs can cause stomach bleeding or kidney problems in certain people  If you take blood thinner medicine, always ask your primary healthcare provider (PHP) if NSAIDs are safe for you  Always read the medicine label and follow directions  · Acetaminophen  decreases pain and fever  It is available without a doctor's order  Ask how much to take and how often to take it  Follow directions  Acetaminophen can cause liver damage if not taken correctly  · Prescription pain medicine  may be given  Ask your PHP how to take this medicine safely  · Take your medicine as directed  Contact your PHP if you think your medicine is not helping or if you have side effects  Tell him if you are allergic to any medicine  Keep a list of the medicines, vitamins, and herbs you take  Include the amounts, and when and why you take them  Bring the list or the pill bottles to follow-up visits  Carry your medicine list with you in case of an emergency  Follow up with your PHP or gynecologist as directed: You may need to return for more tests  Write down your questions so you remember to ask them during your visits  Rest as needed: You may feel like resting more after surgery  Slowly start to do more each day  Ask when you can return to your usual activities  Contact your PHP or gynecologist if:   · You have heavy vaginal bleeding that fills 1 or more sanitary pads in 1 hour  · You have a fever  · You have new or increasing bright red blood coming from your vagina or your incisions      · You have trouble urinating, burning when you urinate, or feel a need to urinate often  · You have trouble having a bowel movement  · You have yellow, green, or foul-smelling discharge coming from your vagina  · Your incision is red, swollen, or has pus or foul-smelling drainage coming from it  · You have pain that gets worse instead of better, or that is not controlled with your pain medicine  · Your skin is itchy, swollen, or has a rash  · You have questions or concerns about your condition or care  Seek care immediately or call 911 if:   · Your arm or leg feels warm, tender, and painful  It may look swollen and red  · You feel lightheaded, short of breath, and have chest pain  · You cough up blood  © 2014 [address] is for End User's use only and may not be sold, redistributed or otherwise used for commercial purposes  All illustrations and images included in CareNotes® are the copyrighted property of A D A M , Inc  or Jadon Rocha  The above information is an  only  It is not intended as medical advice for individual conditions or treatments  Talk to your doctor, nurse or pharmacist before following any medical regimen to see if it is safe and effective for you

## 2022-02-09 NOTE — OP NOTE
PERATIVE REPORT  PATIENT NAME: Javon Stanford    :  1975  MRN: [id number]  Pt Location: [facility] OR ROOM 01    SURGERY DATE: 2022    Surgeon(s) and Role:     * Andrea Chacon MD - Primary     * Barbra Craft MD - Assisting    Preop Diagnosis:  Fibroid uterus [D25 9]  Pelvic pain in female [R10 2]    Post-Op Diagnosis Codes:     * Fibroid uterus [D25 9]     * Pelvic pain in female [R10 2]    Procedure(s) (LRB):  HYSTERECTOMY LAPAROSCOPIC ASSISTED VAGINAL (LAVH) (N/A)  SALPINGECTOMY, LAPAROSCOPIC (Bilateral)    Specimen(s):  ID Type Source Tests Collected by Time Destination   1 : Uterus, Cervix, and Bilateral Tubes Tissue Uterus TISSUE Rudi Mcgarry MD 2022 1050        Estimated Blood Loss: 250 mL    Drains:  [REMOVED] NG/OG/Enteral Tube Orogastric 16 Fr Right mouth (Removed)   Number of days: 0       [REMOVED] Urethral Catheter Non-latex 16 Fr  (Removed)   Number of days: 0       Anesthesia Type: General    Operative Indications:  Fibroid uterus [D25 9]  Pelvic pain in female [R10 2]    Operative Findings:  1  10 cm uterus with two large fibroids, one anterior and one posterior  2  Bilateral simple cysts on the ovaries, normal appearing Fallopian tubes  3  No evidence of entry injury  4  Normal appearing bladder mucosa with no evidence of injury and bilateral efflux of urine from ureteral orifices seen on cystoscopy    Operative Procedure  The patient was taken to the operating room where she was placed under general endotracheal anesthesia without difficulty  She was prepped and draped in the usual sterile fashion in the dorsal lithotomy position in low Abdi stirrups   A izquierdo catheter was placed in the bladder under aseptic technique  Stay sutures were placed on the cervix on the lateral aspects, and the Kriss manipulator was placed  The single tooth tenaculum and the weighted speculum were then removed from the vagina  The Kriss tip and vaginal cuff occluder were inflated        Surgeons gloves were then changed, and attention was then turned to the abdomen  With the patient in the flat position, 5 cc of 0 25% Marcaine was injected into the infraumbilical fold  A 5 mm incision was made in the umbilical fold  A 5-mm trocar was advanced into the abdomen using direct visualization technique  Intraabdominal placement was confirmed via the laparoscope, and pneumoperitoneum was achieved with CO2 gas  A lateral port was placed on each side in the following manner: lateral to inferior epigastric vessels, the skin was infiltrated with 5 cc of 0 25% Marcaine, and a 5 mm incision was made   A 5 mm trocar was placed on each side under direct visualization, and intraabdominal placement was confirmed  The patient was placed in Trendelenburg  The uterus was grasped with a [place] grasper and deviated to the side, and the fallopian tube, uteroovarian ligament and broad ligament were divided on each side using the Ligasure  The fallopian tube on each side was excised along the mesosalpinx using the Enseal  The bladder flap was dissected away from the lower uterine segment using the Enseal  The uterine arteries were divided on each side using the Enseal, and hemostasis was confirmed  Pneumoperitoneum was released, and the laparoscope was removed from the abdomen  A weighted speculum was then placed in the vagina, and the cervix was grasped with single tooth tenaculum  The cervix was circumferentially injected with 0 25% Marcaine  The cervix was circumferentially incised  Posterior colpotomy was made without difficulty, and the posterior peritoneum was tagged with 0 Vicryl suture  A long-billed weighted speculum was placed in the posterior cul-de-sac  The scalpel was then used to incise the anterior cervicovaginal mucosa  Dissection was carried sharply in the vesicovaginal space until the peritoneal reflection was visualized and entered  Curved [place] retractor was placed under examining finger   The uterosacral ligaments on each side were clamped with Isamar clamps  These were transected and suture ligated with 0-Vicryl  The cardinal ligaments on each side were taken in serial fashion by clamping, transecting and suture ligating using 0-Vicryl  The uterus was bivalved using Richey scissors and delivered through the vagina  The uterosacral ligaments were transfixed to the cuff, and the cuff was reapproximated with running, locked suture of 0-Vicryl incorporating the peritoneum  The vagina was irrigated  Surgeons gloves were changed, and attention was then turned back to the abdomen where the laparoscope was reintroduced and pneumoperitoneum achieved   The pelvis was irrigated and suctioned  All pedicles were hemostatic  The lateral ports were removed under direct visualization  The pneumoperitoneum was released and umbilical port removed  The port sites were closed with 4-0 Monocryl  Sponge, lap, needle and instrument counts were correct times two at the end of the procedure  The izquierdo was removed and cystoscopy was performed  Bilateral ureteral jets were visualized, as was the bladder dome  No damage to the bladder was visualized on cystoscopy  The bladder was drained and the cystoscope was removed  All instruments were then removed from the vagina  The patient was awoken from anesthesia and taken to recovery in stable condition      Patient Disposition:  PACU       SIGNATURE: Bud Mckinley MD  DATE: February 9, 2022  TIME: 11:34 AM

## 2022-02-09 NOTE — PROGRESS NOTES
Post-Op processes and procedures were explained and all related patient and family questions were answered  Discharge instructions were given and all related patient and family questions were answered  Pt d/c to home at this time w/ , mother and RN  Via wheelchair to main lobby to meet  with car  Pt left with all belongings  Iv was D/C intact with dry sterile dressing  Encouraged to keep follow up appointments, Verbalized understanding  D/C instructions reviewed and explained  Verbalized understanding  New Rx sent to pt pharmacy and pt  aware to go and pick them up  Verbalized understanding

## 2022-02-09 NOTE — INTERVAL H&P NOTE
H&P reviewed  After examining the patient I find no changes in the patients condition since the H&P had been written      Vitals:    02/09/22 0700   BP: 112/63   Pulse: 65   Resp: 16   Temp: (!) 97 2 °F (36 2 °C)   SpO2: 97%

## 2022-02-09 NOTE — PERIOPERATIVE NURSING NOTE
Patient received back to [facility] room 7  Family present  Patient able to tolerate liquids , VSS, incisions dry and in tact x 3  Call bell within reach  Will continue to monitor

## 2022-02-14 ENCOUNTER — OFFICE VISIT (OUTPATIENT)
Dept: OBGYN CLINIC | Facility: CLINIC | Age: 47
End: 2022-02-14

## 2022-02-14 VITALS — SYSTOLIC BLOOD PRESSURE: 100 MMHG | WEIGHT: 111 LBS | BODY MASS INDEX: 20.3 KG/M2 | DIASTOLIC BLOOD PRESSURE: 62 MMHG

## 2022-02-14 DIAGNOSIS — Z90.710 S/P LAPAROSCOPIC ASSISTED VAGINAL HYSTERECTOMY (LAVH): Primary | ICD-10-CM

## 2022-02-14 PROCEDURE — 99024 POSTOP FOLLOW-UP VISIT: CPT | Performed by: OBSTETRICS & GYNECOLOGY

## 2022-02-14 NOTE — PROGRESS NOTES
Gloria Dougherty Vinay is a 55 y o   female here for a postop visit  Patient is 5 days post LAVH bilateral salpingectomy for fibroid uterus  Patient reports tolerating regular diet, appetite is returning, somewhat sore in the abdomen but otherwise is improving  Patient had some constipation and gas but that has resolved and is having normal bowel movements, voiding without difficulty no vaginal bleeding or discharge  Patient has a job where she is sitting most the day as an  and she desires to start back next Thursday  Reviewed that this should be safe in okay if she desires  Gynecologic History  Patient's last menstrual period was 2022  Contraception: status post hysterectomy  Last Pap:    Results were: normal  Last mammogram:  2022  Results were: normal    Obstetric History  OB History    Para Term  AB Living   1 1 1     1   SAB IAB Ectopic Multiple Live Births           1      # Outcome Date GA Lbr Aguila/2nd Weight Sex Delivery Anes PTL Lv   1 Term 2010    F Vag-Spont   DENISE         The following portions of the patient's history were reviewed and updated as appropriate: allergies, current medications, past family history, past medical history, past social history, past surgical history and problem list     Review of Systems  Review of Systems   Constitutional: Negative for fatigue, fever and unexpected weight change  HENT: Negative for dental problem, sinus pressure and sinus pain  Eyes: Negative for visual disturbance  Respiratory: Negative for cough, shortness of breath and wheezing  Cardiovascular: Negative for chest pain and leg swelling  Gastrointestinal: Negative for blood in stool, constipation, diarrhea, nausea and vomiting  Endocrine: Negative for cold intolerance, heat intolerance and polydipsia  Genitourinary: Negative for dysuria, frequency, hematuria, menstrual problem and pelvic pain     Musculoskeletal: Negative for arthralgias and back pain  Neurological: Negative for dizziness, seizures and headaches  Psychiatric/Behavioral: The patient is not nervous/anxious           Objective     /62 (BP Location: Right arm, Patient Position: Sitting, Cuff Size: Standard)   Wt 50 3 kg (111 lb)   LMP 2022   BMI 20 30 kg/m²   General appearance: alert and oriented, in no acute distress  Lungs: clear to auscultation bilaterally  Heart: regular rate and rhythm, S1, S2 normal, no murmur, click, rub or gallop  Abdomen: soft, non-tender; bowel sounds normal; no masses,  no organomegaly and Incisions clean dry intact healing well still with glue present on abdomen reviewed using Vaseline and gentle friction to clear the glue      Assessment    55year-old  5 days post LAVH bilateral salpingectomy steadily recovering     Plan  Return in 2-3 weeks for next postop

## 2022-02-14 NOTE — LETTER
February 17, 2022     Patient: Smita Soto   YOB: 1975   Date of Visit: 2/14/2022       To Whom It May Concern: It is my medical opinion that Smita Soto may return to work on 02/21/22  If you have any questions or concerns, please don't hesitate to call           Sincerely,        Luz Maria Canada MD    CC: No Recipients

## 2022-02-28 ENCOUNTER — OFFICE VISIT (OUTPATIENT)
Dept: OBGYN CLINIC | Facility: CLINIC | Age: 47
End: 2022-02-28

## 2022-02-28 VITALS — DIASTOLIC BLOOD PRESSURE: 82 MMHG | SYSTOLIC BLOOD PRESSURE: 124 MMHG | BODY MASS INDEX: 21.4 KG/M2 | WEIGHT: 117 LBS

## 2022-02-28 DIAGNOSIS — Z90.710 S/P LAPAROSCOPIC ASSISTED VAGINAL HYSTERECTOMY (LAVH): Primary | ICD-10-CM

## 2022-02-28 DIAGNOSIS — B37.9 YEAST INFECTION: ICD-10-CM

## 2022-02-28 PROCEDURE — 99024 POSTOP FOLLOW-UP VISIT: CPT | Performed by: OBSTETRICS & GYNECOLOGY

## 2022-02-28 RX ORDER — FLUCONAZOLE 150 MG/1
150 TABLET ORAL EVERY OTHER DAY
Qty: 2 TABLET | Refills: 0 | Status: SHIPPED | OUTPATIENT
Start: 2022-02-28 | End: 2022-03-03

## 2022-02-28 NOTE — PROGRESS NOTES
Subjective     Ovidio Garcia is a 55 y o   female here for a follow-up visit  Patient is 3 weeks post LAVH bilateral salpingectomy slowly recovering had yeast which is resolved, tolerating regular diet, voiding and bowel without difficulty, appetite is returning, generally less sore  Patient is generally feeling better  Gynecologic History  Patient's last menstrual period was 2022  Contraception: status post hysterectomy  Last Pap:  2020  Results were: normal  Last mammogram:  2022  Results were: normal    Obstetric History  OB History    Para Term  AB Living   1 1 1     1   SAB IAB Ectopic Multiple Live Births           1      # Outcome Date GA Lbr Aguila/2nd Weight Sex Delivery Anes PTL Lv   1 Term 2010    F Vag-Spont   DENISE         The following portions of the patient's history were reviewed and updated as appropriate: allergies, current medications, past family history, past medical history, past social history, past surgical history and problem list     Review of Systems  Review of Systems   Constitutional: Negative for chills and fever  HENT: Negative for ear pain and sore throat  Eyes: Negative for pain and visual disturbance  Respiratory: Negative for cough and shortness of breath  Cardiovascular: Negative for chest pain and palpitations  Gastrointestinal: Negative for abdominal pain and vomiting  Genitourinary: Negative for dysuria and hematuria  Musculoskeletal: Negative for arthralgias and back pain  Skin: Negative for color change and rash  Neurological: Negative for seizures and syncope  All other systems reviewed and are negative         Objective     /82 (BP Location: Right arm, Patient Position: Sitting, Cuff Size: Standard)   Wt 53 1 kg (117 lb)   LMP 2022   BMI 21 40 kg/m²   General appearance: alert and oriented, in no acute distress  Lungs: clear to auscultation bilaterally  Heart: regular rate and rhythm, S1, S2 normal, no murmur, click, rub or gallop  Abdomen: soft, non-tender; bowel sounds normal; no masses,  no organomegaly and Incisions clean dry intact healing well  Pelvic: external genitalia normal, no adnexal masses or tenderness and Normal vagina well-healing cuff some stitches visible and some small amount of discharge no palpable collection no bleeding      Assessment  55year-old  3 weeks post LAVH bilateral salpingectomy steadily recovering       Plan  Patient to call with any concerns patient should return for annual or sooner as needed

## 2022-03-14 ENCOUNTER — TELEPHONE (OUTPATIENT)
Dept: OBGYN CLINIC | Facility: CLINIC | Age: 47
End: 2022-03-14

## 2022-03-21 ENCOUNTER — OFFICE VISIT (OUTPATIENT)
Dept: OBGYN CLINIC | Facility: CLINIC | Age: 47
End: 2022-03-21

## 2022-03-21 VITALS
BODY MASS INDEX: 20.43 KG/M2 | WEIGHT: 111 LBS | DIASTOLIC BLOOD PRESSURE: 84 MMHG | SYSTOLIC BLOOD PRESSURE: 132 MMHG | HEIGHT: 62 IN

## 2022-03-21 DIAGNOSIS — R55 VASO-VAGAL REACTION: Primary | ICD-10-CM

## 2022-03-21 PROCEDURE — 99024 POSTOP FOLLOW-UP VISIT: CPT | Performed by: OBSTETRICS & GYNECOLOGY

## 2022-04-11 ENCOUNTER — OFFICE VISIT (OUTPATIENT)
Dept: FAMILY MEDICINE CLINIC | Facility: CLINIC | Age: 47
End: 2022-04-11
Payer: COMMERCIAL

## 2022-04-11 VITALS
HEIGHT: 62 IN | OXYGEN SATURATION: 98 % | HEART RATE: 72 BPM | RESPIRATION RATE: 16 BRPM | DIASTOLIC BLOOD PRESSURE: 78 MMHG | WEIGHT: 112 LBS | BODY MASS INDEX: 20.61 KG/M2 | SYSTOLIC BLOOD PRESSURE: 124 MMHG

## 2022-04-11 DIAGNOSIS — Z23 NEED FOR PROPHYLACTIC VACCINATION AGAINST DIPHTHERIA AND TETANUS: ICD-10-CM

## 2022-04-11 DIAGNOSIS — R55 NEAR SYNCOPE: ICD-10-CM

## 2022-04-11 DIAGNOSIS — Z00.00 ENCOUNTER FOR ANNUAL PHYSICAL EXAM: Primary | ICD-10-CM

## 2022-04-11 DIAGNOSIS — F32.0 CURRENT MILD EPISODE OF MAJOR DEPRESSIVE DISORDER WITHOUT PRIOR EPISODE (HCC): ICD-10-CM

## 2022-04-11 PROBLEM — S02.2XXA CLOSED FRACTURE OF NASAL SEPTUM: Status: RESOLVED | Noted: 2019-09-17 | Resolved: 2022-04-11

## 2022-04-11 PROBLEM — Z82.49 FAMILY HISTORY OF HEART DISEASE: Status: ACTIVE | Noted: 2022-04-11

## 2022-04-11 PROBLEM — S02.2XXA CLOSED FRACTURE OF NASAL BONES: Status: RESOLVED | Noted: 2019-09-17 | Resolved: 2022-04-11

## 2022-04-11 PROBLEM — Z86.19 H/O COLD SORES: Status: ACTIVE | Noted: 2022-04-11

## 2022-04-11 PROBLEM — F40.243 FEAR OF FLYING: Status: ACTIVE | Noted: 2022-04-11

## 2022-04-11 PROCEDURE — 99386 PREV VISIT NEW AGE 40-64: CPT | Performed by: FAMILY MEDICINE

## 2022-04-11 PROCEDURE — 90471 IMMUNIZATION ADMIN: CPT | Performed by: FAMILY MEDICINE

## 2022-04-11 PROCEDURE — 90715 TDAP VACCINE 7 YRS/> IM: CPT | Performed by: FAMILY MEDICINE

## 2022-04-11 RX ORDER — BUPROPION HCL 300 MG
300 TABLET, EXTENDED RELEASE 24 HR ORAL DAILY
Qty: 30 TABLET | Refills: 5
Start: 2022-04-11

## 2022-04-11 RX ORDER — ACYCLOVIR 200 MG/1
CAPSULE ORAL
COMMUNITY

## 2022-04-11 NOTE — PROGRESS NOTES
Subjective:      Patient ID: Yolanda Victor is a 55 y o  female  26-year-old female presents as new patient to the practice  Prior PCP was actually her employer so she has been wanting to establish with new PCP that she does not work for  Patient did recently have laparoscopic vaginal hysterectomy in February  She has occasionally been experiencing episodes of sweaty palms in near syncope episodes since having hysterectomy  Ovaries remain  Gynecologist believes that it may be hormonal symptoms that she is adjusting to  Has only happened twice  Patient does have family history of heart disease  She is on branded Wellbutrin XL which does work well for her  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  Patient does have p r n  Alprazolam for very stressful situations such as flying or very crowded places  Past Medical History:   Diagnosis Date    Abnormal Pap smear of cervix 1998    Anxiety     Depression     Fibroid 2020    Obsessive compulsive disorder     RESOLVED 9/4/14    PMDD (premenstrual dysphoric disorder)        Family History   Problem Relation Age of Onset    Other Father         ACUTE MYOCARDIAL INFARCTION    Coronary artery disease Paternal Grandmother     Stroke Paternal Grandmother     Heart disease Paternal Grandmother        Past Surgical History:   Procedure Laterality Date    ANKLE SURGERY      CERVICAL BIOPSY  W/ LOOP ELECTRODE EXCISION  1995    COLPOSCOPY  05/09/2003    CERVIX WITH BIOPSY(S)    FRACTURE SURGERY      MOUTH SURGERY      TOOTH EXTRACTION    OTHER SURGICAL HISTORY      ENDOCERVICAL CURETTAGE (NOT D&C)    FL LAP,RMV  ADNEXAL STRUCTURE Bilateral 2/9/2022    Procedure: SALPINGECTOMY, LAPAROSCOPIC;  Surgeon: Vincent Simmons MD;  Location: [address] [place] [address];  Service: Gynecology    FL LAP,VAG HYST,UTERUS 250GMS/<,SALP-OOPH N/A 2/9/2022    Procedure: HYSTERECTOMY LAPAROSCOPIC ASSISTED VAGINAL (LAVH);   Surgeon: Vincent Simmons MD;  Location: [address] [place] [address]; Service: Gynecology        reports that she has never smoked  She has never used smokeless tobacco  She reports current alcohol use of about 4 0 standard drinks of alcohol per week  She reports that she does not use drugs  Current Outpatient Medications:     acyclovir (ZOVIRAX) 200 mg capsule, Take by mouth every 4 (four) hours while awake  , Disp: , Rfl:     ALPRAZolam (XANAX) 0 5 mg tablet, TK 1 T PO Q 8  H PRN, Disp: , Rfl: 1    Wellbutrin  MG 24 hr tablet, Take 1 tablet (300 mg total) by mouth in the morning, Disp: 30 tablet, Rfl: 5    The following portions of the patient's history were reviewed and updated as appropriate: allergies, current medications, past family history, past medical history, past social history, past surgical history and problem list     Review of Systems   Constitutional: Negative  HENT: Negative  Eyes: Negative  Respiratory: Negative  Cardiovascular: Negative  Gastrointestinal: Negative  Endocrine: Negative  Musculoskeletal: Negative  Skin: Negative  Allergic/Immunologic: Negative  Neurological: Positive for light-headedness (Two episodes since having hysterectomy)  Hematological: Negative  Psychiatric/Behavioral: Negative  Objective:    /78   Pulse 72   Resp 16   Ht 5' 2" (1 575 m)   Wt 50 8 kg (112 lb)   LMP 02/01/2022   SpO2 98%   BMI 20 49 kg/m²      Physical Exam  Vitals and nursing note reviewed  Constitutional:       General: She is not in acute distress  Appearance: Normal appearance  She is well-developed and normal weight  She is not diaphoretic  HENT:      Head: Normocephalic and atraumatic  Right Ear: Tympanic membrane, ear canal and external ear normal       Left Ear: Tympanic membrane, ear canal and external ear normal    Eyes:      Extraocular Movements: Extraocular movements intact  Conjunctiva/sclera: Conjunctivae normal       Pupils: Pupils are equal, round, and reactive to light  Cardiovascular:      Rate and Rhythm: Normal rate and regular rhythm  Pulses: Normal pulses  Heart sounds: Normal heart sounds  No murmur heard  Pulmonary:      Effort: Pulmonary effort is normal       Breath sounds: Normal breath sounds  Abdominal:      General: Abdomen is flat  Bowel sounds are normal       Palpations: Abdomen is soft  Musculoskeletal:         General: Normal range of motion  Cervical back: Normal range of motion and neck supple  Skin:     General: Skin is warm and dry  Findings: No rash  Neurological:      General: No focal deficit present  Mental Status: She is alert and oriented to person, place, and time  Mental status is at baseline  Deep Tendon Reflexes: Reflexes are normal and symmetric  Psychiatric:         Mood and Affect: Mood normal          Behavior: Behavior normal          Thought Content: Thought content normal          Judgment: Judgment normal            No results found for this or any previous visit (from the past 1008 hour(s))  Assessment/Plan:    Depression  Patient has been on branded Wellbutrin  mg once daily which has worked extremely well for her  She does not presently need refills but will contact the office 1 refills are necessary  Encounter for annual physical exam  Annual physical examination performed  Patient is status post laparoscopic hysterectomy  She does have order provided by gynecologist for CBC, iron studies, TSH and free T4  Order provided for lipid panel as well as CMP  Need for prophylactic vaccination against diphtheria and tetanus  Likely last had Adacel booster 12 years ago as her youngest child is 6years of age  She will be doing some home renovation projects  Recommended Adacel booster which she received in the office    Near syncope  Patient notes 2 episodes of lightheadedness with sweating palms since having laparoscopic hysterectomy 2 months ago    Reassured patient that I do not believe that her episodes are anything cardiogenic  I agree with gynecologist that these are likely hormonal in nature  Gynecologist has ordered labs including CBC, thyroid function testing, and [facility]          Problem List Items Addressed This Visit        Cardiovascular and Mediastinum    Near syncope     Patient notes 2 episodes of lightheadedness with sweating palms since having laparoscopic hysterectomy 2 months ago  Reassured patient that I do not believe that her episodes are anything cardiogenic  I agree with gynecologist that these are likely hormonal in nature  Gynecologist has ordered labs including CBC, thyroid function testing, and FSH            Other    Depression     Patient has been on branded Wellbutrin  mg once daily which has worked extremely well for her  She does not presently need refills but will contact the office 1 refills are necessary  Relevant Medications    Wellbutrin  MG 24 hr tablet    Encounter for annual physical exam - Primary     Annual physical examination performed  Patient is status post laparoscopic hysterectomy  She does have order provided by gynecologist for CBC, iron studies, TSH and free T4  Order provided for lipid panel as well as CMP  Relevant Orders    Comprehensive metabolic panel    Lipid panel    Need for prophylactic vaccination against diphtheria and tetanus     Likely last had Adacel booster 12 years ago as her youngest child is 6years of age  She will be doing some home renovation projects    Recommended Adacel booster which she received in the office         Relevant Orders    TDAP VACCINE GREATER THAN OR EQUAL TO 8YO IM (Completed)

## 2022-04-11 NOTE — ASSESSMENT & PLAN NOTE
Annual physical examination performed  Patient is status post laparoscopic hysterectomy  She does have order provided by gynecologist for CBC, iron studies, TSH and free T4  Order provided for lipid panel as well as CMP

## 2022-04-11 NOTE — ASSESSMENT & PLAN NOTE
Patient notes 2 episodes of lightheadedness with sweating palms since having laparoscopic hysterectomy 2 months ago  Reassured patient that I do not believe that her episodes are anything cardiogenic  I agree with gynecologist that these are likely hormonal in nature    Gynecologist has ordered labs including CBC, thyroid function testing, and [facility]

## 2022-04-11 NOTE — ASSESSMENT & PLAN NOTE
Likely last had Adacel booster 12 years ago as her youngest child is 6years of age  She will be doing some home renovation projects    Recommended Adacel booster which she received in the office

## 2022-04-11 NOTE — ASSESSMENT & PLAN NOTE
Patient has been on branded Wellbutrin  mg once daily which has worked extremely well for her  She does not presently need refills but will contact the office 1 refills are necessary

## 2022-05-05 LAB
ALBUMIN SERPL-MCNC: 4.3 G/DL (ref 3.6–5.1)
ALBUMIN/GLOB SERPL: 1.7 (CALC) (ref 1–2.5)
ALP SERPL-CCNC: 57 U/L (ref 31–125)
ALT SERPL-CCNC: 13 U/L (ref 6–29)
AST SERPL-CCNC: 13 U/L (ref 10–35)
BASOPHILS # BLD AUTO: 71 CELLS/UL (ref 0–200)
BASOPHILS NFR BLD AUTO: 2.1 %
BILIRUB SERPL-MCNC: 0.7 MG/DL (ref 0.2–1.2)
BUN SERPL-MCNC: 8 MG/DL (ref 7–25)
BUN/CREAT SERPL: NORMAL (CALC) (ref 6–22)
CALCIUM SERPL-MCNC: 9.3 MG/DL (ref 8.6–10.2)
CHLORIDE SERPL-SCNC: 102 MMOL/L (ref 98–110)
CHOLEST SERPL-MCNC: 187 MG/DL
CHOLEST/HDLC SERPL: 2.6 (CALC)
CO2 SERPL-SCNC: 30 MMOL/L (ref 20–32)
CREAT SERPL-MCNC: 0.6 MG/DL (ref 0.5–1.1)
EOSINOPHIL # BLD AUTO: 252 CELLS/UL (ref 15–500)
EOSINOPHIL NFR BLD AUTO: 7.4 %
ERYTHROCYTE [DISTWIDTH] IN BLOOD BY AUTOMATED COUNT: 12.1 % (ref 11–15)
FSH SERPL-ACNC: 5.9 MIU/ML
GLOBULIN SER CALC-MCNC: 2.5 G/DL (CALC) (ref 1.9–3.7)
GLUCOSE SERPL-MCNC: 79 MG/DL (ref 65–99)
HCT VFR BLD AUTO: 40.3 % (ref 35–45)
HDLC SERPL-MCNC: 73 MG/DL
HGB BLD-MCNC: 13.6 G/DL (ref 11.7–15.5)
IRON SATN MFR SERPL: 29 % (CALC) (ref 16–45)
IRON SERPL-MCNC: 115 MCG/DL (ref 40–190)
LDLC SERPL CALC-MCNC: 99 MG/DL (CALC)
LYMPHOCYTES # BLD AUTO: 1153 CELLS/UL (ref 850–3900)
LYMPHOCYTES NFR BLD AUTO: 33.9 %
MCH RBC QN AUTO: 31.7 PG (ref 27–33)
MCHC RBC AUTO-ENTMCNC: 33.7 G/DL (ref 32–36)
MCV RBC AUTO: 93.9 FL (ref 80–100)
MONOCYTES # BLD AUTO: 252 CELLS/UL (ref 200–950)
MONOCYTES NFR BLD AUTO: 7.4 %
NEUTROPHILS # BLD AUTO: 1673 CELLS/UL (ref 1500–7800)
NEUTROPHILS NFR BLD AUTO: 49.2 %
NONHDLC SERPL-MCNC: 114 MG/DL (CALC)
PLATELET # BLD AUTO: 224 THOUSAND/UL (ref 140–400)
PMV BLD REES-ECKER: 11.9 FL (ref 7.5–12.5)
POTASSIUM SERPL-SCNC: 4.2 MMOL/L (ref 3.5–5.3)
PROT SERPL-MCNC: 6.8 G/DL (ref 6.1–8.1)
RBC # BLD AUTO: 4.29 MILLION/UL (ref 3.8–5.1)
SL AMB EGFR AFRICAN AMERICAN: 127 ML/MIN/1.73M2
SL AMB EGFR NON AFRICAN AMERICAN: 109 ML/MIN/1.73M2
SODIUM SERPL-SCNC: 139 MMOL/L (ref 135–146)
T4 FREE SERPL-MCNC: 1.2 NG/DL (ref 0.8–1.8)
TIBC SERPL-MCNC: 393 MCG/DL (CALC) (ref 250–450)
TRIGL SERPL-MCNC: 61 MG/DL
TSH SERPL-ACNC: 1.53 MIU/L
WBC # BLD AUTO: 3.4 THOUSAND/UL (ref 3.8–10.8)

## 2022-05-05 NOTE — RESULT ENCOUNTER NOTE
Please call patient and notify that results are essentially normal   Normal thyroid functions, blood count, cholesterol, follicle-stimulating hormone    White count is mildly suppressed but she is not developing any infections so of no real concern

## 2022-08-04 ENCOUNTER — TELEPHONE (OUTPATIENT)
Dept: OBGYN CLINIC | Facility: CLINIC | Age: 47
End: 2022-08-04

## 2022-08-04 DIAGNOSIS — B37.9 YEAST INFECTION: Primary | ICD-10-CM

## 2022-08-04 RX ORDER — FLUCONAZOLE 150 MG/1
150 TABLET ORAL EVERY OTHER DAY
Qty: 2 TABLET | Refills: 0 | Status: SHIPPED | OUTPATIENT
Start: 2022-08-04 | End: 2022-08-07

## 2022-08-04 NOTE — TELEPHONE ENCOUNTER
Pt lmom - has yeast infection, has been using OTC cream but when stops the cream it comes right back in 3 days so requesting diflucan called into [facility] in [facility]

## 2022-10-12 PROBLEM — Z01.419 GYNECOLOGIC EXAM NORMAL: Status: RESOLVED | Noted: 2021-07-21 | Resolved: 2022-10-12

## 2022-11-28 DIAGNOSIS — B37.9 YEAST INFECTION: Primary | ICD-10-CM

## 2022-11-28 RX ORDER — FLUCONAZOLE 150 MG/1
150 TABLET ORAL ONCE
Qty: 1 TABLET | Refills: 0 | Status: SHIPPED | OUTPATIENT
Start: 2022-11-28 | End: 2022-11-28

## 2022-11-28 NOTE — TELEPHONE ENCOUNTER
Patient is having itching and irritation, no discharge  Patient said she has gotten diflucan in the past and it worked  Sending script to get signed off on  Told patient if symptoms worsen or dont get better to give us a call back

## 2022-12-21 DIAGNOSIS — B37.9 YEAST INFECTION: Primary | ICD-10-CM

## 2022-12-21 RX ORDER — FLUCONAZOLE 150 MG/1
150 TABLET ORAL DAILY
Qty: 2 TABLET | Refills: 0 | Status: SHIPPED | OUTPATIENT
Start: 2022-12-21 | End: 2022-12-23

## 2022-12-27 RX ORDER — FLUCONAZOLE 150 MG/1
150 TABLET ORAL DAILY
Qty: 2 TABLET | Refills: 0 | Status: SHIPPED | OUTPATIENT
Start: 2022-12-27 | End: 2022-12-29

## 2023-01-27 DIAGNOSIS — N60.01 BREAST CYST, RIGHT: Primary | ICD-10-CM

## 2023-05-31 DIAGNOSIS — B37.9 YEAST INFECTION: Primary | ICD-10-CM

## 2023-05-31 RX ORDER — FLUCONAZOLE 150 MG/1
150 TABLET ORAL ONCE
Qty: 1 TABLET | Refills: 0 | Status: CANCELLED | OUTPATIENT
Start: 2023-05-31 | End: 2023-05-31

## 2023-05-31 RX ORDER — FLUCONAZOLE 150 MG/1
150 TABLET ORAL DAILY
Qty: 2 TABLET | Refills: 0 | Status: SHIPPED | OUTPATIENT
Start: 2023-05-31 | End: 2023-06-02

## 2023-05-31 NOTE — TELEPHONE ENCOUNTER
Pt [place], is having a mild yeast infection   She would diflucan called in to [facility]

## 2023-06-16 ENCOUNTER — RA CDI HCC (OUTPATIENT)
Dept: OTHER | Facility: HOSPITAL | Age: 48
End: 2023-06-16

## 2023-06-16 NOTE — PROGRESS NOTES
[facility]  coding opportunities       Chart reviewed, no opportunity found: CHART REVIEWED, NO OPPORTUNITY FOUND     Patients Insurance     Commercial Insurance: [employer]

## 2023-06-19 ENCOUNTER — OFFICE VISIT (OUTPATIENT)
Dept: FAMILY MEDICINE CLINIC | Facility: CLINIC | Age: 48
End: 2023-06-19
Payer: COMMERCIAL

## 2023-06-19 VITALS
HEART RATE: 80 BPM | SYSTOLIC BLOOD PRESSURE: 124 MMHG | BODY MASS INDEX: 19.69 KG/M2 | OXYGEN SATURATION: 99 % | HEIGHT: 62 IN | DIASTOLIC BLOOD PRESSURE: 80 MMHG | RESPIRATION RATE: 16 BRPM | WEIGHT: 107 LBS

## 2023-06-19 DIAGNOSIS — K13.70 LESION OF BUCCAL MUCOSA: ICD-10-CM

## 2023-06-19 DIAGNOSIS — Z00.00 ENCOUNTER FOR ANNUAL PHYSICAL EXAM: Primary | ICD-10-CM

## 2023-06-19 DIAGNOSIS — F40.243 FEAR OF FLYING: ICD-10-CM

## 2023-06-19 PROCEDURE — 99396 PREV VISIT EST AGE 40-64: CPT | Performed by: FAMILY MEDICINE

## 2023-06-19 PROCEDURE — 99213 OFFICE O/P EST LOW 20 MIN: CPT | Performed by: FAMILY MEDICINE

## 2023-06-19 RX ORDER — DICYCLOMINE HCL 20 MG
TABLET ORAL
COMMUNITY
Start: 2023-06-09

## 2023-06-19 RX ORDER — ALPRAZOLAM 0.5 MG/1
0.5 TABLET ORAL 3 TIMES DAILY PRN
Qty: 60 TABLET | Refills: 1 | Status: SHIPPED | OUTPATIENT
Start: 2023-06-19

## 2023-06-19 NOTE — ASSESSMENT & PLAN NOTE
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

## 2023-06-19 NOTE — PROGRESS NOTES
Subjective:      Patient ID: Lillie Nathan is a 52 y o  female  26-year-old female presents to the office for annual physical examination  Patient also complains of developing oral thrush as well as frequent vaginal yeast infections  Patient states that she develops white patches on both inner sides of her cheeks with becomes very inflamed and has pain with chewing and swallowing at times  Patient has tried probiotics and stated that that seems to make things worse  She did perform Candida cleanse  From my count she has had Diflucan prescribed 5 times from her gynecologist for self-reported yeast infections  Patient states that she takes the Diflucan treatments and oftentimes does not see any significant improvement  Past Medical History:   Diagnosis Date   • Abnormal Pap smear of cervix 1998   • Anxiety    • Depression    • Fibroid 2020   • Obsessive compulsive disorder     RESOLVED 9/4/14   • PMDD (premenstrual dysphoric disorder)        Family History   Problem Relation Age of Onset   • Other Father         ACUTE MYOCARDIAL INFARCTION   • Coronary artery disease Paternal Grandmother    • Stroke Paternal Grandmother    • Heart disease Paternal Grandmother        Past Surgical History:   Procedure Laterality Date   • ANKLE SURGERY     • CERVICAL BIOPSY  W/ LOOP ELECTRODE EXCISION  1995   • COLPOSCOPY  05/09/2003    CERVIX WITH BIOPSY(S)   • FRACTURE SURGERY     • MOUTH SURGERY      TOOTH EXTRACTION   • OTHER SURGICAL HISTORY      ENDOCERVICAL CURETTAGE (NOT D&C)   • CT LAPAROSCOPY W/RMVL ADNEXAL STRUCTURES Bilateral 2/9/2022    Procedure: SALPINGECTOMY, LAPAROSCOPIC;  Surgeon: Dominic Cr MD;  Location: [address] [place] [address];  Service: Gynecology   • CT LAPS W/VAG HYSTERECT 250 GM/&RMVL TUBE&/OVARIES N/A 2/9/2022    Procedure: HYSTERECTOMY LAPAROSCOPIC ASSISTED VAGINAL (LAVH); Surgeon: Dominic Cr MD;  Location: [address] [place] [address];  Service: Gynecology        reports that she has never smoked   She has never used "smokeless tobacco  She reports current alcohol use of about 4 0 standard drinks of alcohol per week  She reports that she does not use drugs  Current Outpatient Medications:   •  acyclovir (ZOVIRAX) 200 mg capsule, Take by mouth every 4 (four) hours while awake  , Disp: , Rfl:   •  ALPRAZolam (XANAX) 0 5 mg tablet, Take 1 tablet (0 5 mg total) by mouth 3 (three) times a day as needed for anxiety, Disp: 60 tablet, Rfl: 1  •  Wellbutrin  MG 24 hr tablet, Take 1 tablet (300 mg total) by mouth in the morning, Disp: 30 tablet, Rfl: 5  •  dicyclomine (BENTYL) 20 mg tablet, , Disp: , Rfl:     The following portions of the patient's history were reviewed and updated as appropriate: allergies, current medications, past family history, past medical history, past social history, past surgical history and problem list     Review of Systems   Constitutional: Negative  HENT: Positive for mouth sores  Eyes: Negative  Respiratory: Negative  Cardiovascular: Negative  Gastrointestinal: Negative  Endocrine: Negative  Genitourinary: Negative  Reported frequent yeast infections   Musculoskeletal: Negative  Skin: Negative  Allergic/Immunologic: Negative  Neurological: Negative  Hematological: Negative  Psychiatric/Behavioral: Negative  Objective:    /80 (BP Location: Left arm, Patient Position: Sitting, Cuff Size: Standard)   Pulse 80   Resp 16   Ht 5' 2\" (1 575 m)   Wt 48 5 kg (107 lb)   LMP 02/01/2022   SpO2 99%   BMI 19 57 kg/m²      Physical Exam  Vitals and nursing note reviewed  Constitutional:       General: She is not in acute distress  Appearance: Normal appearance  She is well-developed and normal weight  She is not diaphoretic  HENT:      Head: Normocephalic and atraumatic        Right Ear: Tympanic membrane, ear canal and external ear normal       Left Ear: Tympanic membrane, ear canal and external ear normal       Nose: Nose normal       " Mouth/Throat:      Comments: White lacy, linear patches on the buccal mucosa on both sides  Eyes:      Extraocular Movements: Extraocular movements intact  Conjunctiva/sclera: Conjunctivae normal       Pupils: Pupils are equal, round, and reactive to light  Cardiovascular:      Rate and Rhythm: Normal rate and regular rhythm  Pulses: Normal pulses  Heart sounds: Normal heart sounds  No murmur heard  Pulmonary:      Effort: Pulmonary effort is normal       Breath sounds: Normal breath sounds  Abdominal:      General: Bowel sounds are normal       Palpations: Abdomen is soft  Musculoskeletal:         General: Normal range of motion  Cervical back: Normal range of motion and neck supple  Skin:     General: Skin is warm and dry  Findings: No rash  Neurological:      General: No focal deficit present  Mental Status: She is alert and oriented to person, place, and time  Mental status is at baseline  Deep Tendon Reflexes: Reflexes are normal and symmetric  Psychiatric:         Mood and Affect: Mood normal          Behavior: Behavior normal          Thought Content: Thought content normal          Judgment: Judgment normal            No results found for this or any previous visit (from the past 1008 hour(s))  Assessment/Plan:    Lesion of buccal mucosa  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 lichen planus especially since the patient has had self-reported 5 vaginal yeast infections that have not responded to oral antifungal treatment with fluconazole    -Referral to ENT for further evaluation and possible biopsy  She is so happens that she had seen [facility] when she broke her nose 5 years ago      Fear of flying  Refill provided of as needed alprazolam   Patient remains on Wellbutrin XL    Encounter for annual physical exam  Annual physical examination performed    -Order provided for screening blood work including CBC, CMP, TSH and lipid profile          Problem List Items Addressed This Visit        Digestive    Lesion of buccal mucosa     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 lichen planus especially since the patient has had self-reported 5 vaginal yeast infections that have not responded to oral antifungal treatment with fluconazole    -Referral to ENT for further evaluation and possible biopsy  She is so happens that she had seen [facility] when she broke her nose 5 years ago           Relevant Orders    Ambulatory Referral to Otolaryngology       Other    Encounter for annual physical exam - Primary     Annual physical examination performed    -Order provided for screening blood work including CBC, CMP, TSH and lipid profile         Relevant Orders    CBC and differential    Comprehensive metabolic panel    Lipid panel    TSH, 3rd generation with Free T4 reflex    Fear of flying     Refill provided of as needed alprazolam   Patient remains on Wellbutrin XL         Relevant Medications    ALPRAZolam (XANAX) 0 5 mg tablet

## 2023-06-19 NOTE — ASSESSMENT & PLAN NOTE
Annual physical examination performed    -Order provided for screening blood work including CBC, CMP, TSH and lipid profile

## 2023-09-18 NOTE — PROGRESS NOTES
Assessment/Plan   Diagnoses and all orders for this visit:    Encounter for gynecological examination (general) (routine) without abnormal findings  The current ASCCP guidelines were reviewed. Patient's last pap was 1/15/20 - WNL (-) HRHPV type 16/18 neg and therefore, a pap with HPV cotesting is not indicated at this time. I emphasized the importance of an annual pelvic and breast exam. Patient ok to defer pap today. Encounter for screening mammogram for malignant neoplasm of breast  -     Mammo screening bilateral w 3d & cad; Future  A yearly mammogram is recommended for breast cancer screening starting at age 36;    Discussion  I have discussed the importance of monthly self-breast exams, exercise and healthy diet as well as adequate intake of calcium and vitamin D. Encourage at least 1200 mg calcium citrate + 2000 IUs vitamin D3 divided through diet and supplement throughout the day; Encourage 30-40 min weight bearing exercise most days of week  STI testing - declines  In addition, colon cancer screening with a colonoscopy is recommended starting at age 42-54 and reviewed benefits - she is up to date with screening. All questions have been answered to her satisfaction  RTO for APE or sooner if needed    Subjective     HPI   Scot Gordon is a 52 y.o. female who presents for annual well woman exam. Daughter 15 yr old at [facility]. LMP - 2/1/2022 s/p LAVH bilateral salpingectomy for fibroid uterus - benign pathology; Denies  bleeding  No vulvar itch/burn; No vaginal itch/burn; No abn discharge or odor; No urinary sx - burning/pain/frequency/hematuria  (+) SBEs - no breast masses, asymmetry, nipple discharge or bleeding, changes in skin of breast, or breast tenderness bilaterally  No abd/pelvic pain or HAs; No menopausal symptoms: No hot flashes/night sweats, problems with intercourse, vaginal dryness; sleeping well. Pt is sexually active in a mutually monog/ sexual relationship;  No issues with intercourse; She declines sti/hiv/hep testing; Feels safe at home  (+) PCP for routine Bw/care; Last Pap - 1/15/20 - WNL (-) HRHPV type 16/18 neg  History of abnormal Pap smear: yes s/p LEEP in ; colpo   Last mammo - 23 Birads 0 right breast; left breast normal; Type B density; 23 right breast ultrasound - Birads 2 benign breast cysts - back to bilateral screening mammogram in 1 year  History of abnormal mammogram: yes  Last colonoscopy - 19 - follow-up in 10 years    Review of Systems   Constitutional: Negative for activity change, fatigue, fever and unexpected weight change. HENT: Negative for congestion, dental problem, sinus pressure and sinus pain. Eyes: Negative for visual disturbance. Respiratory: Negative for cough, shortness of breath and wheezing. Cardiovascular: Negative for chest pain and leg swelling. Gastrointestinal: Negative for abdominal distention, abdominal pain, blood in stool, constipation, diarrhea, nausea and vomiting. Endocrine: Negative for polydipsia. Genitourinary: Negative for difficulty urinating, dyspareunia, dysuria, frequency, hematuria, menstrual problem, pelvic pain, urgency, vaginal bleeding, vaginal discharge and vaginal pain. Musculoskeletal: Negative for arthralgias and back pain. Allergic/Immunologic: Negative for environmental allergies. Neurological: Negative for dizziness, seizures and headaches. Psychiatric/Behavioral: Negative for dysphoric mood and sleep disturbance. The patient is not nervous/anxious.         The following portions of the patient's history were reviewed and updated as appropriate: allergies, current medications, past family history, past medical history, past social history, past surgical history and problem list.         OB History        1    Para   1    Term   1            AB        Living   1       SAB        IAB        Ectopic        Multiple        Live Births   1           Obstetric Comments   :   F             Past Medical History:   Diagnosis Date   • Abnormal Pap smear of cervix    • Anxiety    • Depression    • Fibroid    • Obsessive compulsive disorder     RESOLVED 14   • PMDD (premenstrual dysphoric disorder)        Past Surgical History:   Procedure Laterality Date   • ANKLE SURGERY     • CERVICAL BIOPSY  W/ LOOP ELECTRODE EXCISION     • COLPOSCOPY  2003    CERVIX WITH BIOPSY(S)   • FRACTURE SURGERY     • MOUTH SURGERY      TOOTH EXTRACTION   • OTHER SURGICAL HISTORY      ENDOCERVICAL CURETTAGE (NOT D&C)   • NE LAPAROSCOPY W/RMVL ADNEXAL STRUCTURES Bilateral 2022    Procedure: SALPINGECTOMY, LAPAROSCOPIC;  Surgeon: Lulu Rodriguez MD;  Location: [facility];  Service: Gynecology   • NE LAPS W/VAG HYSTERECT 250 GM/&RMVL TUBE&/OVARIES N/A 2022    Procedure: HYSTERECTOMY LAPAROSCOPIC ASSISTED VAGINAL (LAVH); Surgeon: Lulu Rodriguez MD;  Location: [facility];  Service: Gynecology       Family History   Problem Relation Age of Onset   • No Known Problems Mother    • Other Father         ACUTE MYOCARDIAL INFARCTION   • Coronary artery disease Paternal Grandmother    • Stroke Paternal Grandmother    • Heart disease Paternal Grandmother        Social History     Socioeconomic History   • Marital status: /Civil Union     Spouse name: Not on file   • Number of children: Not on file   • Years of education: Not on file   • Highest education level: Not on file   Occupational History   • Not on file   Tobacco Use   • Smoking status: Never   • Smokeless tobacco: Never   Vaping Use   • Vaping Use: Never used   Substance and Sexual Activity   • Alcohol use:  Yes     Alcohol/week: 4.0 standard drinks of alcohol     Types: 4 Cans of beer per week     Comment: SOCIAL   • Drug use: Never   • Sexual activity: Yes     Partners: Male     Birth control/protection: Female Sterilization   Other Topics Concern   • Not on file   Social History Narrative    DAILY CAFFEINATED COFFEE    EXERCISES REGULARLY      Social Determinants of Health     Financial Resource Strain: Not on file   Food Insecurity: Not on file   Transportation Needs: Not on file   Physical Activity: Not on file   Stress: Not on file   Social Connections: Not on file   Intimate Partner Violence: Not on file   Housing Stability: Not on file         Current Outpatient Medications:   •  acyclovir (ZOVIRAX) 200 mg capsule, Take by mouth every 4 (four) hours while awake  , Disp: , Rfl:   •  ALPRAZolam (XANAX) 0.5 mg tablet, Take 1 tablet (0.5 mg total) by mouth 3 (three) times a day as needed for anxiety, Disp: 60 tablet, Rfl: 1  •  Wellbutrin  MG 24 hr tablet, Take 1 tablet (300 mg total) by mouth in the morning, Disp: 30 tablet, Rfl: 5    Allergies   Allergen Reactions   • Iodinated Contrast Media        Objective   Vitals:    09/19/23 0753   BP: 110/74   BP Location: Left arm   Patient Position: Sitting   Cuff Size: Standard   Weight: 50.3 kg (111 lb)   Height: 5' 2" (1.575 m)     Physical Exam  Vitals reviewed. Constitutional:       General: She is awake. She is not in acute distress. Appearance: Normal appearance. She is well-developed and well-groomed. She is not ill-appearing, toxic-appearing or diaphoretic. HENT:      Head: Normocephalic and atraumatic. Eyes:      Conjunctiva/sclera: Conjunctivae normal.   Neck:      Thyroid: No thyroid mass, thyromegaly or thyroid tenderness. Cardiovascular:      Rate and Rhythm: Normal rate and regular rhythm. Heart sounds: Normal heart sounds. No murmur heard. Pulmonary:      Effort: Pulmonary effort is normal. No tachypnea, bradypnea or respiratory distress. Breath sounds: Normal breath sounds. No stridor or decreased air movement. No wheezing. Chest:   Breasts:     Breasts are symmetrical.      Right: Normal. No swelling, bleeding, inverted nipple, mass, nipple discharge, skin change or tenderness.       Left: Normal. No swelling, bleeding, inverted nipple, mass, nipple discharge, skin change or tenderness. Abdominal:      General: There is no distension. Palpations: Abdomen is soft. There is no mass. Tenderness: There is no abdominal tenderness. Hernia: No hernia is present. There is no hernia in the left inguinal area or right inguinal area. Genitourinary:     General: Normal vulva. Exam position: Supine. Pubic Area: No rash or pubic lice. Labia:         Right: No rash, tenderness, lesion or injury. Left: No rash, tenderness, lesion or injury. Urethra: No prolapse, urethral pain, urethral swelling or urethral lesion. Vagina: Normal. No signs of injury and foreign body. No vaginal discharge, erythema, tenderness, bleeding, lesions or prolapsed vaginal walls. Uterus: Absent. Adnexa:         Right: No mass, tenderness or fullness. Left: No mass, tenderness or fullness. Comments: Uterus, cervix, and fallopian tubes surgically absent  Rectal exam deferred by patient and provider considering up to date with colonoscopy    Lymphadenopathy:      Cervical: No cervical adenopathy. Upper Body:      Right upper body: No supraclavicular or axillary adenopathy. Left upper body: No supraclavicular or axillary adenopathy. Lower Body: No right inguinal adenopathy. No left inguinal adenopathy. Skin:     General: Skin is warm and dry. Neurological:      Mental Status: She is alert and oriented to person, place, and time. Psychiatric:         Mood and Affect: Mood and affect normal.         Speech: Speech normal.         Behavior: Behavior normal. Behavior is cooperative. Thought Content:  Thought content normal.         Judgment: Judgment normal.

## 2023-09-19 ENCOUNTER — ANNUAL EXAM (OUTPATIENT)
Dept: OBGYN CLINIC | Facility: CLINIC | Age: 48
End: 2023-09-19
Payer: COMMERCIAL

## 2023-09-19 VITALS
HEIGHT: 62 IN | SYSTOLIC BLOOD PRESSURE: 110 MMHG | DIASTOLIC BLOOD PRESSURE: 74 MMHG | WEIGHT: 111 LBS | BODY MASS INDEX: 20.43 KG/M2

## 2023-09-19 DIAGNOSIS — Z12.31 ENCOUNTER FOR SCREENING MAMMOGRAM FOR MALIGNANT NEOPLASM OF BREAST: ICD-10-CM

## 2023-09-19 DIAGNOSIS — Z01.419 ENCOUNTER FOR GYNECOLOGICAL EXAMINATION (GENERAL) (ROUTINE) WITHOUT ABNORMAL FINDINGS: Primary | ICD-10-CM

## 2023-09-19 PROCEDURE — S0612 ANNUAL GYNECOLOGICAL EXAMINA: HCPCS | Performed by: PHYSICIAN ASSISTANT

## 2023-10-17 ENCOUNTER — TELEPHONE (OUTPATIENT)
Dept: FAMILY MEDICINE CLINIC | Facility: CLINIC | Age: 48
End: 2023-10-17

## 2023-10-17 NOTE — TELEPHONE ENCOUNTER
----- Message from Eugune Osgood, DO sent at 10/17/2023 11:03 AM EDT -----  Please call the patient regarding her abnormal result. abs reviewed from [facility]. Normal cholesterol, thyroid, CMP. White blood count is slightly low at 3000 which she has had in the past.  Slightly low numbers of neutrophils and monocytes. Did she make appointment to see ENT regarding patches in her mouth?

## 2023-11-06 PROBLEM — K13.70 LESION OF BUCCAL MUCOSA: Status: RESOLVED | Noted: 2023-06-19 | Resolved: 2023-11-06

## 2023-12-13 LAB
ALBUMIN SERPL-MCNC: 3.9 G/DL (ref 3.6–5.1)
ALBUMIN/GLOB SERPL: 1.9 (CALC) (ref 1–2.5)
ALP SERPL-CCNC: 49 U/L (ref 31–125)
ALT SERPL-CCNC: 12 U/L (ref 6–29)
AST SERPL-CCNC: 13 U/L (ref 10–35)
BASOPHILS # BLD AUTO: 30 CELLS/UL (ref 0–200)
BASOPHILS NFR BLD AUTO: 1 %
BILIRUB SERPL-MCNC: 0.5 MG/DL (ref 0.2–1.2)
BUN SERPL-MCNC: 8 MG/DL (ref 7–25)
BUN/CREAT SERPL: ABNORMAL (CALC) (ref 6–22)
CALCIUM SERPL-MCNC: 8.6 MG/DL (ref 8.6–10.2)
CHLORIDE SERPL-SCNC: 107 MMOL/L (ref 98–110)
CHOLEST SERPL-MCNC: 169 MG/DL
CHOLEST/HDLC SERPL: 2.3 (CALC)
CO2 SERPL-SCNC: 27 MMOL/L (ref 20–32)
CREAT SERPL-MCNC: 0.62 MG/DL (ref 0.5–0.99)
EOSINOPHIL # BLD AUTO: 282 CELLS/UL (ref 15–500)
EOSINOPHIL NFR BLD AUTO: 9.4 %
ERYTHROCYTE [DISTWIDTH] IN BLOOD BY AUTOMATED COUNT: 12.3 % (ref 11–15)
GFR/BSA.PRED SERPLBLD CYS-BASED-ARV: 110 ML/MIN/1.73M2
GLOBULIN SER CALC-MCNC: 2.1 G/DL (CALC) (ref 1.9–3.7)
GLUCOSE SERPL-MCNC: 84 MG/DL (ref 65–99)
HCT VFR BLD AUTO: 39.1 % (ref 35–45)
HDLC SERPL-MCNC: 72 MG/DL
HGB BLD-MCNC: 13.3 G/DL (ref 11.7–15.5)
LDLC SERPL CALC-MCNC: 86 MG/DL (CALC)
LYMPHOCYTES # BLD AUTO: 1044 CELLS/UL (ref 850–3900)
LYMPHOCYTES NFR BLD AUTO: 34.8 %
MCH RBC QN AUTO: 32.7 PG (ref 27–33)
MCHC RBC AUTO-ENTMCNC: 34 G/DL (ref 32–36)
MCV RBC AUTO: 96.1 FL (ref 80–100)
MONOCYTES # BLD AUTO: 192 CELLS/UL (ref 200–950)
MONOCYTES NFR BLD AUTO: 6.4 %
NEUTROPHILS # BLD AUTO: 1452 CELLS/UL (ref 1500–7800)
NEUTROPHILS NFR BLD AUTO: 48.4 %
NONHDLC SERPL-MCNC: 97 MG/DL (CALC)
PLATELET # BLD AUTO: 187 THOUSAND/UL (ref 140–400)
PMV BLD REES-ECKER: 11.4 FL (ref 7.5–12.5)
POTASSIUM SERPL-SCNC: 4.6 MMOL/L (ref 3.5–5.3)
PROT SERPL-MCNC: 6 G/DL (ref 6.1–8.1)
RBC # BLD AUTO: 4.07 MILLION/UL (ref 3.8–5.1)
SODIUM SERPL-SCNC: 140 MMOL/L (ref 135–146)
TRIGL SERPL-MCNC: 37 MG/DL
TSH SERPL-ACNC: 1.65 MIU/L
WBC # BLD AUTO: 3 THOUSAND/UL (ref 3.8–10.8)

## 2023-12-22 ENCOUNTER — TELEPHONE (OUTPATIENT)
Dept: FAMILY MEDICINE CLINIC | Facility: CLINIC | Age: 48
End: 2023-12-22

## 2023-12-22 NOTE — TELEPHONE ENCOUNTER
----- Message from Alo Sy DO sent at 12/22/2023  8:56 AM EST -----  Please call patient to schedule follow-up appointment to review lab results.  Last seen in June.  Overall her labs look fine but I believe she remains on Wellbutrin so she should be seen for 6-month follow-up

## 2024-02-22 ENCOUNTER — OFFICE VISIT (OUTPATIENT)
Dept: FAMILY MEDICINE CLINIC | Facility: CLINIC | Age: 49
End: 2024-02-22
Payer: COMMERCIAL

## 2024-02-22 VITALS
OXYGEN SATURATION: 99 % | BODY MASS INDEX: 20.24 KG/M2 | DIASTOLIC BLOOD PRESSURE: 72 MMHG | SYSTOLIC BLOOD PRESSURE: 118 MMHG | HEART RATE: 82 BPM | RESPIRATION RATE: 16 BRPM | WEIGHT: 110 LBS | HEIGHT: 62 IN

## 2024-02-22 DIAGNOSIS — F40.243 FEAR OF FLYING: ICD-10-CM

## 2024-02-22 DIAGNOSIS — F32.0 CURRENT MILD EPISODE OF MAJOR DEPRESSIVE DISORDER WITHOUT PRIOR EPISODE (HCC): ICD-10-CM

## 2024-02-22 PROBLEM — F43.9 STRESS AT HOME: Status: ACTIVE | Noted: 2024-02-22

## 2024-02-22 PROCEDURE — 99214 OFFICE O/P EST MOD 30 MIN: CPT | Performed by: FAMILY MEDICINE

## 2024-02-22 RX ORDER — BUPROPION HCL 300 MG
300 TABLET, EXTENDED RELEASE 24 HR ORAL DAILY
Qty: 30 TABLET | Refills: 5 | Status: SHIPPED | OUTPATIENT
Start: 2024-02-22 | End: 2024-03-01 | Stop reason: SDUPTHER

## 2024-02-22 RX ORDER — ALPRAZOLAM 0.5 MG/1
0.5 TABLET ORAL 3 TIMES DAILY PRN
Qty: 60 TABLET | Refills: 1 | Status: SHIPPED | OUTPATIENT
Start: 2024-02-22 | End: 2024-03-01 | Stop reason: SDUPTHER

## 2024-02-22 NOTE — ASSESSMENT & PLAN NOTE
Patient continues doing well on branded Wellbutrin  mg once daily.  I did offer to increase her dosage but at this point the patient would like to continue on 300 mg once daily    -Contact the office when refills are necessary

## 2024-02-22 NOTE — ASSESSMENT & PLAN NOTE
-Significant stress at home as 13-year-old daughter is having mental health issues though this is very stressful for patient and her .  Dealing with that as best as possible.  They are living daughter mental health care

## 2024-02-22 NOTE — PROGRESS NOTES
Subjective:      Patient ID: Quinn Carey is a 48 y.o. female.    48-year-old female presents for follow-up and review of labs.  Patient has been under quite a bit of stress at home.  Daughter has been having mental health crises.  This is been very stressful for patient.  She remains on Wellbutrin  mg once daily, branded medication and has been taking as needed alprazolam which she does split in half.  She does need refills of medications.  She did have labs completed in December.  She was seen by ENT for what I thought was oral lichen planus.  ENT made recommendations for dietary changes for possible reflux        Past Medical History:   Diagnosis Date   • Abnormal Pap smear of cervix 1998   • Anxiety    • Depression    • Fibroid 2020   • Obsessive compulsive disorder     RESOLVED 9/4/14   • PMDD (premenstrual dysphoric disorder)        Family History   Problem Relation Age of Onset   • No Known Problems Mother    • Other Father         ACUTE MYOCARDIAL INFARCTION   • Coronary artery disease Paternal Grandmother    • Stroke Paternal Grandmother    • Heart disease Paternal Grandmother        Past Surgical History:   Procedure Laterality Date   • ANKLE SURGERY     • CERVICAL BIOPSY  W/ LOOP ELECTRODE EXCISION  1995   • COLPOSCOPY  05/09/2003    CERVIX WITH BIOPSY(S)   • FRACTURE SURGERY     • MOUTH SURGERY      TOOTH EXTRACTION   • OTHER SURGICAL HISTORY      ENDOCERVICAL CURETTAGE (NOT D&C)   • NM LAPAROSCOPY W/RMVL ADNEXAL STRUCTURES Bilateral 2/9/2022    Procedure: SALPINGECTOMY, LAPAROSCOPIC;  Surgeon: Rosey Burgess MD;  Location: [facility] OR;  Service: Gynecology   • NM LAPS W/VAG HYSTERECT 250 GM/&RMVL TUBE&/OVARIES N/A 2/9/2022    Procedure: HYSTERECTOMY LAPAROSCOPIC ASSISTED VAGINAL (LAVH);  Surgeon: Rosey Burgess MD;  Location: [facility] OR;  Service: Gynecology        reports that she has never smoked. She has never used smokeless tobacco. She reports current alcohol use of about 4.0 standard drinks  "of alcohol per week. She reports that she does not use drugs.      Current Outpatient Medications:   •  acyclovir (ZOVIRAX) 200 mg capsule, Take by mouth every 4 (four) hours while awake  , Disp: , Rfl:   •  ALPRAZolam (XANAX) 0.5 mg tablet, Take 1 tablet (0.5 mg total) by mouth 3 (three) times a day as needed for anxiety, Disp: 60 tablet, Rfl: 1  •  Wellbutrin  MG 24 hr tablet, Take 1 tablet (300 mg total) by mouth in the morning, Disp: 30 tablet, Rfl: 5    The following portions of the patient's history were reviewed and updated as appropriate: allergies, current medications, past family history, past medical history, past social history, past surgical history and problem list.    Review of Systems   Constitutional: Negative.    HENT: Negative.     Eyes: Negative.    Respiratory: Negative.     Cardiovascular: Negative.    Gastrointestinal: Negative.    Endocrine: Negative.    Genitourinary: Negative.    Musculoskeletal: Negative.    Skin: Negative.    Allergic/Immunologic: Negative.    Neurological: Negative.    Hematological: Negative.    Psychiatric/Behavioral:  Positive for dysphoric mood. The patient is nervous/anxious.            Objective:    /72   Pulse 82   Resp 16   Ht 5' 2\" (1.575 m)   Wt 49.9 kg (110 lb)   LMP 02/01/2022   SpO2 99%   BMI 20.12 kg/m²      Physical Exam  Vitals and nursing note reviewed.   Constitutional:       General: She is not in acute distress.     Appearance: Normal appearance. She is well-developed and normal weight. She is not diaphoretic.   HENT:      Head: Normocephalic and atraumatic.      Right Ear: Tympanic membrane, ear canal and external ear normal.      Left Ear: Tympanic membrane, ear canal and external ear normal.      Nose: Nose normal.      Mouth/Throat:      Comments: White lacy, linear patches on the buccal mucosa on both sides  Eyes:      Extraocular Movements: Extraocular movements intact.      Conjunctiva/sclera: Conjunctivae normal.      " Pupils: Pupils are equal, round, and reactive to light.   Cardiovascular:      Rate and Rhythm: Normal rate and regular rhythm.      Pulses: Normal pulses.      Heart sounds: Normal heart sounds. No murmur heard.  Pulmonary:      Effort: Pulmonary effort is normal.      Breath sounds: Normal breath sounds.   Abdominal:      General: Bowel sounds are normal.      Palpations: Abdomen is soft.   Musculoskeletal:         General: Normal range of motion.      Cervical back: Normal range of motion and neck supple.   Skin:     General: Skin is warm and dry.      Findings: No rash.   Neurological:      General: No focal deficit present.      Mental Status: She is alert and oriented to person, place, and time. Mental status is at baseline.      Deep Tendon Reflexes: Reflexes are normal and symmetric.   Psychiatric:         Mood and Affect: Mood normal.         Behavior: Behavior normal.         Thought Content: Thought content normal.         Judgment: Judgment normal.           Recent Results (from the past 2016 hour(s))   Lipid panel    Collection Time: 12/13/23  2:13 PM   Result Value Ref Range    Total Cholesterol 169 <200 mg/dL    HDL 72 > OR = 50 mg/dL    Triglycerides 37 <150 mg/dL    LDL Calculated 86 mg/dL (calc)    Chol HDLC Ratio 2.3 <5.0 (calc)    Non-HDL Cholesterol 97 <130 mg/dL (calc)   Comprehensive metabolic panel    Collection Time: 12/13/23  2:13 PM   Result Value Ref Range    Glucose, Random 84 65 - 99 mg/dL    BUN 8 7 - 25 mg/dL    Creatinine 0.62 0.50 - 0.99 mg/dL    eGFR 110 > OR = 60 mL/min/1.73m2    SL AMB BUN/CREATININE RATIO SEE NOTE: 6 - 22 (calc)    Sodium 140 135 - 146 mmol/L    Potassium 4.6 3.5 - 5.3 mmol/L    Chloride 107 98 - 110 mmol/L    CO2 27 20 - 32 mmol/L    Calcium 8.6 8.6 - 10.2 mg/dL    Protein, Total 6.0 (L) 6.1 - 8.1 g/dL    Albumin 3.9 3.6 - 5.1 g/dL    Globulin 2.1 1.9 - 3.7 g/dL (calc)    Albumin/Globulin Ratio 1.9 1.0 - 2.5 (calc)    TOTAL BILIRUBIN 0.5 0.2 - 1.2 mg/dL     Alkaline Phosphatase 49 31 - 125 U/L    AST 13 10 - 35 U/L    ALT 12 6 - 29 U/L   CBC and differential    Collection Time: 12/13/23  2:13 PM   Result Value Ref Range    White Blood Cell Count 3.0 (L) 3.8 - 10.8 Thousand/uL    Red Blood Cell Count 4.07 3.80 - 5.10 Million/uL    Hemoglobin 13.3 11.7 - 15.5 g/dL    HCT 39.1 35.0 - 45.0 %    MCV 96.1 80.0 - 100.0 fL    MCH 32.7 27.0 - 33.0 pg    MCHC 34.0 32.0 - 36.0 g/dL    RDW 12.3 11.0 - 15.0 %    Platelet Count 187 140 - 400 Thousand/uL    SL AMB MPV 11.4 7.5 - 12.5 fL    Neutrophils (Absolute) 1,452 (L) 1,500 - 7,800 cells/uL    Lymphocytes (Absolute) 1,044 850 - 3,900 cells/uL    Monocytes (Absolute) 192 (L) 200 - 950 cells/uL    Eosinophils (Absolute) 282 15 - 500 cells/uL    Basophils ABS 30 0 - 200 cells/uL    Neutrophils 48.4 %    Lymphocytes 34.8 %    Monocytes 6.4 %    Eosinophils 9.4 %    Basophils PCT 1.0 %   HOUSE ACCOUNT TRACKING    Collection Time: 12/13/23  2:13 PM   Result Value Ref Range    Tracking House Account     TSH, 3rd generation with Free T4 reflex    Collection Time: 12/13/23  2:13 PM   Result Value Ref Range    TSH W/RFX TO FREE T4 1.65 mIU/L        Assessment/Plan:    Stress at home  -Significant stress at home as 13-year-old daughter is having mental health issues though this is very stressful for patient and her .  Dealing with that as best as possible.  They are living daughter mental health care    Fear of flying  Refill provided of as needed alprazolam    Depression  Patient continues doing well on branded Wellbutrin  mg once daily.  I did offer to increase her dosage but at this point the patient would like to continue on 300 mg once daily    -Contact the office when refills are necessary          Problem List Items Addressed This Visit        Other    Depression     Patient continues doing well on branded Wellbutrin  mg once daily.  I did offer to increase her dosage but at this point the patient would like to  continue on 300 mg once daily    -Contact the office when refills are necessary         Relevant Medications    Wellbutrin  MG 24 hr tablet    ALPRAZolam (XANAX) 0.5 mg tablet    Fear of flying     Refill provided of as needed alprazolam         Relevant Medications    ALPRAZolam (XANAX) 0.5 mg tablet

## 2024-02-29 DIAGNOSIS — F40.243 FEAR OF FLYING: ICD-10-CM

## 2024-02-29 DIAGNOSIS — F32.0 CURRENT MILD EPISODE OF MAJOR DEPRESSIVE DISORDER WITHOUT PRIOR EPISODE (HCC): ICD-10-CM

## 2024-02-29 RX ORDER — ALPRAZOLAM 0.5 MG/1
0.5 TABLET ORAL 3 TIMES DAILY PRN
Qty: 60 TABLET | Refills: 1 | OUTPATIENT
Start: 2024-02-29

## 2024-02-29 RX ORDER — BUPROPION HCL 300 MG
300 TABLET, EXTENDED RELEASE 24 HR ORAL DAILY
Qty: 30 TABLET | Refills: 5 | OUTPATIENT
Start: 2024-02-29

## 2024-02-29 NOTE — TELEPHONE ENCOUNTER
These prescriptions were sent last week.  When they come up for refills you can actually see that they were sent 1 week ago.  Why is this being sent along to me

## 2024-02-29 NOTE — TELEPHONE ENCOUNTER
Patient was in last week and never got medication sent or printed scripts.     Reason for call:   [x] Refill   [] Prior Auth  [] Other:     Office:   [x] PCP/Provider -   [] Specialty/Provider -     Medication: Xanax    Dose/Frequency: 0.5 mg     Quantity: #60    [facility]    Does the patient have enough for 3 days?   [] Yes   [x] No - Send as HP to POD              Reason for call:   [x] Refill   [] Prior Auth  [] Other:     Office:   [x] PCP/Provider -   [] Specialty/Provider -     Medication: Wellbutrin    Dose/Frequency: 300 mg     Quantity: #30    [facility]    Does the patient have enough for 3 days?   [] Yes   [x] No - Send as HP to POD

## 2024-03-01 DIAGNOSIS — F40.243 FEAR OF FLYING: ICD-10-CM

## 2024-03-01 DIAGNOSIS — F32.0 CURRENT MILD EPISODE OF MAJOR DEPRESSIVE DISORDER WITHOUT PRIOR EPISODE (HCC): ICD-10-CM

## 2024-03-01 RX ORDER — BUPROPION HCL 300 MG
300 TABLET, EXTENDED RELEASE 24 HR ORAL DAILY
Qty: 30 TABLET | Refills: 0 | Status: CANCELLED | OUTPATIENT
Start: 2024-03-01

## 2024-03-01 RX ORDER — BUPROPION HCL 300 MG
300 TABLET, EXTENDED RELEASE 24 HR ORAL DAILY
Qty: 30 TABLET | Refills: 5 | Status: SHIPPED | OUTPATIENT
Start: 2024-03-01

## 2024-03-01 RX ORDER — ALPRAZOLAM 0.5 MG/1
0.5 TABLET ORAL 3 TIMES DAILY PRN
Qty: 60 TABLET | Refills: 0 | Status: CANCELLED | OUTPATIENT
Start: 2024-03-01

## 2024-03-01 RX ORDER — ALPRAZOLAM 0.5 MG/1
0.5 TABLET ORAL 3 TIMES DAILY PRN
Qty: 60 TABLET | Refills: 1 | Status: SHIPPED | OUTPATIENT
Start: 2024-03-01

## 2024-03-01 NOTE — TELEPHONE ENCOUNTER
"Patient was seen in the office last week. 2/22/24 prescriptions were set to a class of \"PRINT\". On that date there was an issue with e-prescribing. As per note, patient did not receive printed prescriptions. The prescriptions will need to be ordered. Please advise.     Pt also asked to please make sure Wellbutrin is brand.    "

## 2024-07-01 ENCOUNTER — OFFICE VISIT (OUTPATIENT)
Dept: URGENT CARE | Facility: CLINIC | Age: 49
End: 2024-07-01
Payer: COMMERCIAL

## 2024-07-01 VITALS
SYSTOLIC BLOOD PRESSURE: 116 MMHG | RESPIRATION RATE: 18 BRPM | HEART RATE: 61 BPM | DIASTOLIC BLOOD PRESSURE: 63 MMHG | TEMPERATURE: 98.2 F | OXYGEN SATURATION: 100 %

## 2024-07-01 DIAGNOSIS — J06.9 VIRAL URI WITH COUGH: Primary | ICD-10-CM

## 2024-07-01 PROCEDURE — 99213 OFFICE O/P EST LOW 20 MIN: CPT | Performed by: FAMILY MEDICINE

## 2024-07-01 RX ORDER — BENZONATATE 200 MG/1
200 CAPSULE ORAL 3 TIMES DAILY PRN
Qty: 15 CAPSULE | Refills: 0 | Status: SHIPPED | OUTPATIENT
Start: 2024-07-01

## 2024-07-01 RX ORDER — BROMPHENIRAMINE MALEATE, PSEUDOEPHEDRINE HYDROCHLORIDE, AND DEXTROMETHORPHAN HYDROBROMIDE 2; 30; 10 MG/5ML; MG/5ML; MG/5ML
10 SYRUP ORAL 3 TIMES DAILY PRN
Qty: 200 ML | Refills: 0 | Status: SHIPPED | OUTPATIENT
Start: 2024-07-01

## 2024-07-01 NOTE — PROGRESS NOTES
[facility]        NAME: Quinn Carey is a 48 y.o. female  : 1975    MRN: [id number]  DATE: 2024  TIME: 12:18 PM    Assessment and Plan   Viral URI with cough [J06.9]  1. Viral URI with cough  brompheniramine-pseudoephedrine-DM 30-2-10 MG/5ML syrup    benzonatate (TESSALON) 200 MG capsule            Patient Instructions     Decongestants given for congestion. No signs of bacterial infection today. Follow up with PCP in 3-5 days if no improvement. Proceed to ER if symptoms worsen.    Chief Complaint     Chief Complaint   Patient presents with   • Cough     Pt reports having productive cough since Thursday, states currently being treated for strep.         History of Present Illness     Quinn Carey is a 48 y.o. female presenting to the office today for upper respiratory complaints.   Symptoms have been present for *** days, and include ***.   She has tried *** for her symptoms, *** relief.  Sick contacts include: ***      Review of Systems     Review of Systems    Current Medications       Current Outpatient Medications:   •  benzonatate (TESSALON) 200 MG capsule, Take 1 capsule (200 mg total) by mouth 3 (three) times a day as needed for cough, Disp: 15 capsule, Rfl: 0  •  brompheniramine-pseudoephedrine-DM 30-2-10 MG/5ML syrup, Take 10 mL by mouth 3 (three) times a day as needed for cough or congestion, Disp: 200 mL, Rfl: 0  •  acyclovir (ZOVIRAX) 200 mg capsule, Take by mouth every 4 (four) hours while awake  , Disp: , Rfl:   •  ALPRAZolam (XANAX) 0.5 mg tablet, Take 1 tablet (0.5 mg total) by mouth 3 (three) times a day as needed for anxiety, Disp: 60 tablet, Rfl: 1  •  Wellbutrin  MG 24 hr tablet, Take 1 tablet (300 mg total) by mouth in the morning, Disp: 30 tablet, Rfl: 5    Current Allergies     Allergies as of 2024 - Reviewed 2024   Allergen Reaction Noted   • Iodinated contrast media  2008            The following portions of the patient's history were reviewed  and updated as appropriate: allergies, current medications, past family history, past medical history, past social history, past surgical history and problem list.     Past Medical History:   Diagnosis Date   • Abnormal Pap smear of cervix 1998   • Anxiety    • Depression    • Fibroid 2020   • Obsessive compulsive disorder     RESOLVED 9/4/14   • PMDD (premenstrual dysphoric disorder)        Past Surgical History:   Procedure Laterality Date   • ANKLE SURGERY     • CERVICAL BIOPSY  W/ LOOP ELECTRODE EXCISION  1995   • COLPOSCOPY  05/09/2003    CERVIX WITH BIOPSY(S)   • FRACTURE SURGERY     • MOUTH SURGERY      TOOTH EXTRACTION   • OTHER SURGICAL HISTORY      ENDOCERVICAL CURETTAGE (NOT D&C)   • RI LAPAROSCOPY W/RMVL ADNEXAL STRUCTURES Bilateral 2/9/2022    Procedure: SALPINGECTOMY, LAPAROSCOPIC;  Surgeon: Rosey Burgess MD;  Location: [facility];  Service: Gynecology   • RI LAPS W/VAG HYSTERECT 250 GM/&RMVL TUBE&/OVARIES N/A 2/9/2022    Procedure: HYSTERECTOMY LAPAROSCOPIC ASSISTED VAGINAL (LAVH);  Surgeon: Rosey Burgess MD;  Location: [facility];  Service: Gynecology       Family History   Problem Relation Age of Onset   • No Known Problems Mother    • Other Father         ACUTE MYOCARDIAL INFARCTION   • Coronary artery disease Paternal Grandmother    • Stroke Paternal Grandmother    • Heart disease Paternal Grandmother        Medications have been verified.    Objective     /63   Pulse 61   Temp 98.2 °F (36.8 °C)   Resp 18   LMP 02/01/2022   SpO2 100%   Patient's last menstrual period was 02/01/2022.     Physical Exam     Physical Exam

## 2024-07-01 NOTE — PROGRESS NOTES
[facility]        NAME: Quinn Carey is a 48 y.o. female  : 1975    MRN: [id number]  DATE: 2024  TIME: 12:26 PM    Assessment and Plan   Viral URI with cough [J06.9]  1. Viral URI with cough  brompheniramine-pseudoephedrine-DM 30-2-10 MG/5ML syrup    benzonatate (TESSALON) 200 MG capsule            Patient Instructions     Decongestants given for congestion. No signs of bacterial infection today. Follow up with PCP in 3-5 days if no improvement. Proceed to ER if symptoms worsen.    Chief Complaint     Chief Complaint   Patient presents with   • Cough     Pt reports having productive cough since Thursday, states currently being treated for strep.         History of Present Illness     Quinn Carey is a 48 y.o. female presenting to the office today for upper respiratory complaints.   Symptoms have been present for 5 days, and include productive cough associated with losing her voice. She tested positive for strep throat on Wednesday and was treated with antibiotics and her cough started on Thursday.  She has tried Flonase and loratadine for her symptoms, no relief.  Sick contacts include: none      Review of Systems     Review of Systems   Constitutional:  Negative for chills, fatigue and fever.   HENT:  Positive for sore throat and voice change (hoarseness). Negative for congestion, ear pain, postnasal drip and sinus pain.    Eyes:  Negative for pain, redness and itching.   Respiratory:  Positive for cough. Negative for shortness of breath and wheezing.    Cardiovascular:  Negative for chest pain and palpitations.   Gastrointestinal:  Negative for abdominal pain, constipation, diarrhea, nausea and vomiting.   Genitourinary:  Negative for difficulty urinating and hematuria.   Musculoskeletal:  Negative for arthralgias and myalgias.   Skin:  Negative for pallor and rash.   Neurological:  Negative for dizziness, weakness, light-headedness and headaches.   Psychiatric/Behavioral:  Negative for  agitation and sleep disturbance. The patient is not nervous/anxious.        Current Medications       Current Outpatient Medications:   •  benzonatate (TESSALON) 200 MG capsule, Take 1 capsule (200 mg total) by mouth 3 (three) times a day as needed for cough, Disp: 15 capsule, Rfl: 0  •  brompheniramine-pseudoephedrine-DM 30-2-10 MG/5ML syrup, Take 10 mL by mouth 3 (three) times a day as needed for cough or congestion, Disp: 200 mL, Rfl: 0  •  acyclovir (ZOVIRAX) 200 mg capsule, Take by mouth every 4 (four) hours while awake  , Disp: , Rfl:   •  ALPRAZolam (XANAX) 0.5 mg tablet, Take 1 tablet (0.5 mg total) by mouth 3 (three) times a day as needed for anxiety, Disp: 60 tablet, Rfl: 1  •  Wellbutrin  MG 24 hr tablet, Take 1 tablet (300 mg total) by mouth in the morning, Disp: 30 tablet, Rfl: 5    Current Allergies     Allergies as of 07/01/2024 - Reviewed 07/01/2024   Allergen Reaction Noted   • Iodinated contrast media  08/14/2008            The following portions of the patient's history were reviewed and updated as appropriate: allergies, current medications, past family history, past medical history, past social history, past surgical history and problem list.     Past Medical History:   Diagnosis Date   • Abnormal Pap smear of cervix 1998   • Anxiety    • Depression    • Fibroid 2020   • Obsessive compulsive disorder     RESOLVED 9/4/14   • PMDD (premenstrual dysphoric disorder)        Past Surgical History:   Procedure Laterality Date   • ANKLE SURGERY     • CERVICAL BIOPSY  W/ LOOP ELECTRODE EXCISION  1995   • COLPOSCOPY  05/09/2003    CERVIX WITH BIOPSY(S)   • FRACTURE SURGERY     • MOUTH SURGERY      TOOTH EXTRACTION   • OTHER SURGICAL HISTORY      ENDOCERVICAL CURETTAGE (NOT D&C)   • CO LAPAROSCOPY W/RMVL ADNEXAL STRUCTURES Bilateral 2/9/2022    Procedure: SALPINGECTOMY, LAPAROSCOPIC;  Surgeon: Rosey Burgess MD;  Location: [facility];  Service: Gynecology   • CO LAPS W/VAG HYSTERECT 250 GM/&RMVL  TUBE&/OVARIES N/A 2/9/2022    Procedure: HYSTERECTOMY LAPAROSCOPIC ASSISTED VAGINAL (LAVH);  Surgeon: Rosey Burgess MD;  Location: [facility];  Service: Gynecology       Family History   Problem Relation Age of Onset   • No Known Problems Mother    • Other Father         ACUTE MYOCARDIAL INFARCTION   • Coronary artery disease Paternal Grandmother    • Stroke Paternal Grandmother    • Heart disease Paternal Grandmother        Medications have been verified.    Objective     /63   Pulse 61   Temp 98.2 °F (36.8 °C)   Resp 18   LMP 02/01/2022   SpO2 100%   Patient's last menstrual period was 02/01/2022.     Physical Exam     Physical Exam  Constitutional:       General: She is not in acute distress.     Appearance: Normal appearance. She is not ill-appearing.   HENT:      Head: Normocephalic and atraumatic.      Right Ear: Ear canal normal. A middle ear effusion is present. Tympanic membrane is not erythematous.      Left Ear: Ear canal normal. A middle ear effusion is present. Tympanic membrane is not erythematous.      Nose: No congestion.      Mouth/Throat:      Mouth: Mucous membranes are moist.      Pharynx: Posterior oropharyngeal erythema present. No pharyngeal swelling or oropharyngeal exudate.      Tonsils: No tonsillar exudate or tonsillar abscesses.   Eyes:      General:         Right eye: No discharge.         Left eye: No discharge.      Extraocular Movements: Extraocular movements intact.      Pupils: Pupils are equal, round, and reactive to light.   Cardiovascular:      Rate and Rhythm: Normal rate and regular rhythm.      Pulses: Normal pulses.      Heart sounds: Normal heart sounds. No murmur heard.  Pulmonary:      Effort: Pulmonary effort is normal. No respiratory distress.      Breath sounds: Normal breath sounds. No wheezing.   Musculoskeletal:      Cervical back: Normal range of motion. No rigidity.   Skin:     General: Skin is warm.      Coloration: Skin is not pale.   Neurological:       General: No focal deficit present.      Mental Status: She is alert and oriented to person, place, and time.   Psychiatric:         Mood and Affect: Mood normal.         Behavior: Behavior normal.         Thought Content: Thought content normal.         Judgment: Judgment normal.

## 2024-07-04 DIAGNOSIS — F32.0 CURRENT MILD EPISODE OF MAJOR DEPRESSIVE DISORDER WITHOUT PRIOR EPISODE (HCC): ICD-10-CM

## 2024-07-05 RX ORDER — BUPROPION HCL 300 MG
300 TABLET, EXTENDED RELEASE 24 HR ORAL DAILY
Qty: 90 TABLET | Refills: 1 | Status: SHIPPED | OUTPATIENT
Start: 2024-07-05

## 2024-08-07 DIAGNOSIS — F32.0 CURRENT MILD EPISODE OF MAJOR DEPRESSIVE DISORDER WITHOUT PRIOR EPISODE (HCC): ICD-10-CM

## 2024-08-07 RX ORDER — BUPROPION HCL 300 MG
300 TABLET, EXTENDED RELEASE 24 HR ORAL DAILY
Qty: 90 TABLET | Refills: 0 | Status: SHIPPED | OUTPATIENT
Start: 2024-08-07

## 2024-08-07 NOTE — TELEPHONE ENCOUNTER
Reason for call: pt states prescription needs to say brand name only   [x] Refill   [] Prior Auth  [] Other:     Office:   [x] PCP/Provider -   [] Specialty/Provider -     Medication:         [facility]: [facility]  [address]    Does the patient have enough for 3 days?   [x] Yes   [] No - Send as HP to POD

## 2024-09-13 ENCOUNTER — HOSPITAL ENCOUNTER (OUTPATIENT)
Dept: MAMMOGRAPHY | Facility: HOSPITAL | Age: 49
Discharge: HOME | End: 2024-09-13
Payer: COMMERCIAL

## 2024-09-13 VITALS — HEIGHT: 62 IN | BODY MASS INDEX: 20.24 KG/M2 | WEIGHT: 110 LBS

## 2024-09-13 DIAGNOSIS — Z12.31 ENCOUNTER FOR SCREENING MAMMOGRAM FOR MALIGNANT NEOPLASM OF BREAST: ICD-10-CM

## 2024-09-13 PROCEDURE — 77063 BREAST TOMOSYNTHESIS BI: CPT

## 2024-09-13 PROCEDURE — 77067 SCR MAMMO BI INCL CAD: CPT

## 2024-09-16 ENCOUNTER — RA CDI HCC (OUTPATIENT)
Dept: OTHER | Facility: HOSPITAL | Age: 49
End: 2024-09-16

## 2024-09-16 PROBLEM — Z00.00 ENCOUNTER FOR ANNUAL PHYSICAL EXAM: Status: RESOLVED | Noted: 2022-04-11 | Resolved: 2024-09-16

## 2024-09-23 ENCOUNTER — OFFICE VISIT (OUTPATIENT)
Dept: FAMILY MEDICINE CLINIC | Facility: CLINIC | Age: 49
End: 2024-09-23
Payer: COMMERCIAL

## 2024-09-23 VITALS
WEIGHT: 114 LBS | HEIGHT: 62 IN | BODY MASS INDEX: 20.98 KG/M2 | HEART RATE: 80 BPM | RESPIRATION RATE: 16 BRPM | SYSTOLIC BLOOD PRESSURE: 116 MMHG | DIASTOLIC BLOOD PRESSURE: 74 MMHG | OXYGEN SATURATION: 99 %

## 2024-09-23 DIAGNOSIS — Z00.00 ENCOUNTER FOR ANNUAL PHYSICAL EXAM: ICD-10-CM

## 2024-09-23 DIAGNOSIS — F32.A DEPRESSION, UNSPECIFIED DEPRESSION TYPE: Primary | ICD-10-CM

## 2024-09-23 DIAGNOSIS — F32.0 CURRENT MILD EPISODE OF MAJOR DEPRESSIVE DISORDER WITHOUT PRIOR EPISODE (HCC): ICD-10-CM

## 2024-09-23 PROBLEM — F43.9 STRESS AT HOME: Status: RESOLVED | Noted: 2024-02-22 | Resolved: 2024-09-23

## 2024-09-23 PROBLEM — R55 NEAR SYNCOPE: Status: RESOLVED | Noted: 2022-04-11 | Resolved: 2024-09-23

## 2024-09-23 PROCEDURE — 99396 PREV VISIT EST AGE 40-64: CPT | Performed by: FAMILY MEDICINE

## 2024-09-23 RX ORDER — BUPROPION HCL 300 MG
300 TABLET, EXTENDED RELEASE 24 HR ORAL DAILY
Qty: 90 TABLET | Refills: 3 | Status: SHIPPED | OUTPATIENT
Start: 2024-09-23

## 2024-09-23 NOTE — PROGRESS NOTES
Subjective:      Patient ID: Quinn Carey is a 48 y.o. female.    48-year-old female presents for annual physical examination and follow-up in regards to depressive symptoms.  Things are doing better at home.  Still has teenage daughter who is now in high school.  Wellbutrin is working well for her.  Has not had to use as needed alprazolam.  Overall pleased. Current with mammogram.  Gets flu shot at  work        Past Medical History:   Diagnosis Date    Abnormal Pap smear of cervix 1998    Anxiety     Depression     Fibroid 2020    Obsessive compulsive disorder     RESOLVED 9/4/14    PMDD (premenstrual dysphoric disorder)        Family History   Problem Relation Age of Onset    No Known Problems Mother     Other Father         ACUTE MYOCARDIAL INFARCTION    No Known Problems Daughter     No Known Problems Maternal Grandmother     No Known Problems Maternal Grandfather     Coronary artery disease Paternal Grandmother     Stroke Paternal Grandmother     Heart disease Paternal Grandmother     No Known Problems Paternal Grandfather     No Known Problems Maternal Aunt     No Known Problems Paternal Aunt        Past Surgical History:   Procedure Laterality Date    ANKLE SURGERY      CERVICAL BIOPSY  W/ LOOP ELECTRODE EXCISION  1995    COLPOSCOPY  05/09/2003    CERVIX WITH BIOPSY(S)    FRACTURE SURGERY      MOUTH SURGERY      TOOTH EXTRACTION    OTHER SURGICAL HISTORY      ENDOCERVICAL CURETTAGE (NOT D&C)    GA LAPAROSCOPY W/RMVL ADNEXAL STRUCTURES Bilateral 2/9/2022    Procedure: SALPINGECTOMY, LAPAROSCOPIC;  Surgeon: Rosey Burgess MD;  Location: [facility];  Service: Gynecology    GA LAPS W/VAG HYSTERECT 250 GM/&RMVL TUBE&/OVARIES N/A 2/9/2022    Procedure: HYSTERECTOMY LAPAROSCOPIC ASSISTED VAGINAL (LAVH);  Surgeon: Rosey Burgess MD;  Location: [facility];  Service: Gynecology        reports that she has never smoked. She has never used smokeless tobacco. She reports current alcohol use of about 4.0 standard drinks  "of alcohol per week. She reports that she does not use drugs.      Current Outpatient Medications:     acyclovir (ZOVIRAX) 200 mg capsule, Take by mouth every 4 (four) hours while awake  , Disp: , Rfl:     ALPRAZolam (XANAX) 0.5 mg tablet, Take 1 tablet (0.5 mg total) by mouth 3 (three) times a day as needed for anxiety, Disp: 60 tablet, Rfl: 1    Wellbutrin  MG 24 hr tablet, Take 1 tablet (300 mg total) by mouth in the morning, Disp: 90 tablet, Rfl: 3    The following portions of the patient's history were reviewed and updated as appropriate: allergies, current medications, past family history, past medical history, past social history, past surgical history and problem list.    Review of Systems   Constitutional: Negative.    HENT: Negative.     Eyes: Negative.    Respiratory: Negative.     Cardiovascular: Negative.    Gastrointestinal: Negative.    Endocrine: Negative.    Genitourinary: Negative.    Musculoskeletal: Negative.    Skin: Negative.    Allergic/Immunologic: Negative.    Neurological: Negative.    Hematological: Negative.    Psychiatric/Behavioral: Negative.             Objective:    /74   Pulse 80   Resp 16   Ht 5' 2\" (1.575 m)   Wt 51.7 kg (114 lb)   LMP 02/01/2022   SpO2 99%   BMI 20.85 kg/m²      Physical Exam  Vitals and nursing note reviewed.   Constitutional:       General: She is not in acute distress.     Appearance: Normal appearance. She is well-developed. She is not diaphoretic.   HENT:      Head: Normocephalic and atraumatic.      Right Ear: Tympanic membrane, ear canal and external ear normal.      Left Ear: Tympanic membrane, ear canal and external ear normal.      Nose: Nose normal.   Eyes:      Extraocular Movements: Extraocular movements intact.      Conjunctiva/sclera: Conjunctivae normal.      Pupils: Pupils are equal, round, and reactive to light.   Cardiovascular:      Rate and Rhythm: Normal rate and regular rhythm.      Heart sounds: Normal heart sounds. No " murmur heard.  Pulmonary:      Effort: Pulmonary effort is normal.      Breath sounds: Normal breath sounds.   Abdominal:      General: Bowel sounds are normal.      Palpations: Abdomen is soft.   Musculoskeletal:         General: Normal range of motion.      Cervical back: Normal range of motion and neck supple.   Skin:     General: Skin is warm and dry.      Findings: No rash.   Neurological:      General: No focal deficit present.      Mental Status: She is alert and oriented to person, place, and time. Mental status is at baseline.      Deep Tendon Reflexes: Reflexes are normal and symmetric.   Psychiatric:         Mood and Affect: Mood normal.         Behavior: Behavior normal.         Thought Content: Thought content normal.         Judgment: Judgment normal.           No results found for this or any previous visit (from the past 1008 hour(s)).    Assessment/Plan:    No problem-specific Assessment & Plan notes found for this encounter.          Problem List Items Addressed This Visit          Behavioral Health    Depression - Primary     Doing well on Wellbutrin  mg once daily.  Continue same.  Refills provided.  Reevaluate in June         Relevant Medications    Wellbutrin  MG 24 hr tablet       Other    Encounter for annual physical exam     Annual physical examination performed    -Order provided for screening blood work to be done in December around the time of her birthday         Relevant Orders    CBC and differential    Comprehensive metabolic panel    Lipid panel    TSH, 3rd generation with Free T4 reflex

## 2024-09-23 NOTE — ASSESSMENT & PLAN NOTE
Annual physical examination performed    -Order provided for screening blood work to be done in December around the time of her birthday

## 2024-10-16 ENCOUNTER — TELEPHONE (OUTPATIENT)
Age: 49
End: 2024-10-16

## 2024-10-16 DIAGNOSIS — R92.333 HETEROGENEOUSLY DENSE TISSUE OF BOTH BREASTS ON MAMMOGRAPHY: Primary | ICD-10-CM

## 2024-10-16 NOTE — TELEPHONE ENCOUNTER
Patient called and had a mammo recently and was asked by us to see if her insurance would cover an US due to dense breast and they will and she goes to [facility] for imaging. She asked if you could please place the order and please send her a my chart message when it is in and she will then schedule online. Thank you

## 2024-12-20 ENCOUNTER — RESULTS FOLLOW-UP (OUTPATIENT)
Dept: FAMILY MEDICINE CLINIC | Facility: CLINIC | Age: 49
End: 2024-12-20

## 2024-12-20 LAB
ALBUMIN SERPL-MCNC: 4.7 G/DL (ref 3.6–5.1)
ALBUMIN/GLOB SERPL: 1.8 (CALC) (ref 1–2.5)
ALP SERPL-CCNC: 58 U/L (ref 31–125)
ALT SERPL-CCNC: 20 U/L (ref 6–29)
AST SERPL-CCNC: 17 U/L (ref 10–35)
BASOPHILS # BLD AUTO: 62 CELLS/UL (ref 0–200)
BASOPHILS NFR BLD AUTO: 1.4 %
BILIRUB SERPL-MCNC: 0.8 MG/DL (ref 0.2–1.2)
BUN SERPL-MCNC: 11 MG/DL (ref 7–25)
BUN/CREAT SERPL: NORMAL (CALC) (ref 6–22)
CALCIUM SERPL-MCNC: 9.4 MG/DL (ref 8.6–10.2)
CHLORIDE SERPL-SCNC: 102 MMOL/L (ref 98–110)
CHOLEST SERPL-MCNC: 201 MG/DL
CHOLEST/HDLC SERPL: 2.5 (CALC)
CO2 SERPL-SCNC: 27 MMOL/L (ref 20–32)
CREAT SERPL-MCNC: 0.58 MG/DL (ref 0.5–0.99)
EOSINOPHIL # BLD AUTO: 211 CELLS/UL (ref 15–500)
EOSINOPHIL NFR BLD AUTO: 4.8 %
ERYTHROCYTE [DISTWIDTH] IN BLOOD BY AUTOMATED COUNT: 12 % (ref 11–15)
GFR/BSA.PRED SERPLBLD CYS-BASED-ARV: 112 ML/MIN/1.73M2
GLOBULIN SER CALC-MCNC: 2.6 G/DL (CALC) (ref 1.9–3.7)
GLUCOSE SERPL-MCNC: 87 MG/DL (ref 65–99)
HCT VFR BLD AUTO: 41.9 % (ref 35–45)
HDLC SERPL-MCNC: 81 MG/DL
HGB BLD-MCNC: 14.1 G/DL (ref 11.7–15.5)
LDLC SERPL CALC-MCNC: 105 MG/DL (CALC)
LYMPHOCYTES # BLD AUTO: 1395 CELLS/UL (ref 850–3900)
LYMPHOCYTES NFR BLD AUTO: 31.7 %
MCH RBC QN AUTO: 32.5 PG (ref 27–33)
MCHC RBC AUTO-ENTMCNC: 33.7 G/DL (ref 32–36)
MCV RBC AUTO: 96.5 FL (ref 80–100)
MONOCYTES # BLD AUTO: 273 CELLS/UL (ref 200–950)
MONOCYTES NFR BLD AUTO: 6.2 %
NEUTROPHILS # BLD AUTO: 2460 CELLS/UL (ref 1500–7800)
NEUTROPHILS NFR BLD AUTO: 55.9 %
NONHDLC SERPL-MCNC: 120 MG/DL (CALC)
PLATELET # BLD AUTO: 229 THOUSAND/UL (ref 140–400)
PMV BLD REES-ECKER: 11.8 FL (ref 7.5–12.5)
POTASSIUM SERPL-SCNC: 4 MMOL/L (ref 3.5–5.3)
PROT SERPL-MCNC: 7.3 G/DL (ref 6.1–8.1)
RBC # BLD AUTO: 4.34 MILLION/UL (ref 3.8–5.1)
SODIUM SERPL-SCNC: 138 MMOL/L (ref 135–146)
TRIGL SERPL-MCNC: 65 MG/DL
TSH SERPL-ACNC: 2.01 MIU/L
WBC # BLD AUTO: 4.4 THOUSAND/UL (ref 3.8–10.8)

## 2024-12-20 NOTE — RESULT ENCOUNTER NOTE
Please call patient and notify that results of blood work from 2 months ago are normal.  Not certain why this is only coming into her chart 2 months later.  I apologize but her blood work looks great.  Would repeat in 1 year

## 2025-02-04 NOTE — TELEPHONE ENCOUNTER
"Pt called in after receiving a bill from [facility]. Pt states it looks like [facility] grouped together her labs and listed it under \" General Health Panel\". She states the orders for the labs were placed in September, however she didn't go to have her labs completed until 12/19/24. She has reached out to [facility] to get them to correct the date the collection was done on.       Quinn is requesting we print and mail her, her lab orders that were placed on 9/23/24.    Also if Dr. Sy would know why they would group the orders together, instead of how they were written.    Please advise, thank you   "

## 2025-02-07 NOTE — TELEPHONE ENCOUNTER
I left a message notifying patient we mailed the order and to have her contact [facility] regarding any billing questions.

## 2025-02-13 ENCOUNTER — TELEPHONE (OUTPATIENT)
Dept: FAMILY MEDICINE CLINIC | Facility: CLINIC | Age: 50
End: 2025-02-13

## 2025-02-13 NOTE — TELEPHONE ENCOUNTER
Reason for call:   [x] Prior Auth  [] Other:     Caller:  [x] Patient  [] Pharmacy [id number] - [place] - [address]. [phone]     Medication: Wellbutrin (brand)    Dose/Frequency: 1 tab am    Quantity: 90    Ordering Provider:   [x] PCP/Provider - Yossi  [] Speciality/Provider -     Has the patient tried other medications and failed? If failed, which medications did they fail?    [] No   [] Yes - generic gives patient's terrible side effects patient has been on brand for years    Is the patient's insurance updated in EPIC?   [x] Yes   [] No     Is a copy of the patient's insurance scanned in EPIC?   [x] Yes   [] No

## 2025-02-13 NOTE — TELEPHONE ENCOUNTER
PA Wellbutrin  MG APPROVED     Date(s) approved 12/13/24 - 2/12/26    Case # [id number]    Patient advised by          []Wegohart Message  [x]Phone call   []LMOM  []L/M to call office as no active Communication consent on file  []Unable to leave detailed message as VM not approved on Communication consent       Pharmacy advised by    [x]Fax  []Phone call    Specialty Pharmacy    []

## 2025-02-13 NOTE — TELEPHONE ENCOUNTER
PA Wellbutrin  MG SUBMITTED     to [facility]     via    [x]CMM-KEY: [id number]    []Surescripts-Case ID #    []Availity-Auth ID #  NDC #    []Faxed to plan   []Other website    []Phone call Case ID #      [x]PA sent as URGENT    All office notes, labs and other pertaining documents and studies sent. Clinical questions answered. Awaiting determination from insurance company.     Turnaround time for your insurance to make a decision on your Prior Authorization can take 7-21 business days.

## 2025-03-25 ENCOUNTER — TELEPHONE (OUTPATIENT)
Age: 50
End: 2025-03-25

## 2025-03-25 NOTE — TELEPHONE ENCOUNTER
Yamel from [facility] called to request information regarding pt's bloodwork from 9/23/24.  She said pt is questioning a bill stating she had blood drawn at [facility] on 9/23/24, but pt said she had her blood drawn in December.  Yamel is asking if Dr Sy would be able to send a note to [facility] stating pt had her blood drawn in December.  I could not verify when pt had her blood drawn.  I was able to find documentation that the lab orders were placed on 9/23/24 stating expected date was 12/2/24, that the results came in via interface on 12/20/24 and that they were resulted on 12/20/24, but there was no documentation of when the pt had the blood drawn.  I asked why [facility] could not verify the date for her, but she said when she spoke with them they told her to contact pt's PCP.  Yamel also said pt told her she had her blood drawn at her employer's and [employer] picked it up.  Yamel is going to call [facility] again to try to double check the draw date and will perhaps call pt's employer.  MONICAA at this time

## 2025-04-16 NOTE — TELEPHONE ENCOUNTER
Pt called requesting for the following lab orders to be resubmitting with additional Dx codes, the code for just PREVENTATIVE alone is not covered by her insurance.     CBC   TSH   CMP       Please resubmit lab codes to [facility] so they can resubmit lab orders.     [facility]: Mayra Doherty   Fax: [phone]  Tele: [phone]    Please advise

## 2025-04-17 NOTE — TELEPHONE ENCOUNTER
She is a 49-year-old in good health.  She does have a diagnosis of depression.  Those are an expensive standard screening blood work which is the diagnosis that I had used.  I do not even know how I would go about ordering them to be resubmitted for something that was done 4 months ago??

## 2025-05-02 DIAGNOSIS — Z00.6 ENCOUNTER FOR EXAMINATION FOR NORMAL COMPARISON OR CONTROL IN CLINICAL RESEARCH PROGRAM: ICD-10-CM

## 2025-05-08 NOTE — TELEPHONE ENCOUNTER
I spoke with Quinn and I advised her that we received a form from [employer] today asking for additional diagnosis codes.  That form was completed by Dr. Sy and faxed back to [facility].  Patient was appreciative.

## 2025-05-08 NOTE — TELEPHONE ENCOUNTER
Call from patient advising she is still being billed from [email or web address] and is having a lot of difficulty getting this resolved. She is requesting we re-code the scripts. The original script was written on 9/23/24 and she was advised to go for testing in December, When she went for that testing they used the date of 9/23 instead of the date of the draw which started the issue. The only code on original script was z00 which the labs are not covered by insurance at all under just preventation however they would accept continuation of meds for cbc and cmp and hardly sleeping and getting older for PHS. She is working with a representative from [email or web address] and we would be able to contact Ambrocio Chas @ [phone] to gather additional information to assist her is closing this claim with them. Please return her call to assist. Patient advised she has been passed around from person to person and this is not getting resolved.

## 2025-06-19 ENCOUNTER — VBI (OUTPATIENT)
Dept: ADMINISTRATIVE | Facility: OTHER | Age: 50
End: 2025-06-19

## 2025-06-19 NOTE — TELEPHONE ENCOUNTER
06/19/25 1:38 PM     Chart reviewed for CRC: Colonoscopy was/were submitted to the patient's insurance.     Ok Smith MA   [facility]

## 2025-07-01 ENCOUNTER — OFFICE VISIT (OUTPATIENT)
Dept: FAMILY MEDICINE CLINIC | Facility: CLINIC | Age: 50
End: 2025-07-01
Payer: COMMERCIAL

## 2025-07-01 ENCOUNTER — APPOINTMENT (OUTPATIENT)
Dept: LAB | Facility: CLINIC | Age: 50
End: 2025-07-01

## 2025-07-01 VITALS
DIASTOLIC BLOOD PRESSURE: 74 MMHG | BODY MASS INDEX: 21.16 KG/M2 | HEIGHT: 62 IN | OXYGEN SATURATION: 98 % | HEART RATE: 76 BPM | WEIGHT: 115 LBS | SYSTOLIC BLOOD PRESSURE: 122 MMHG

## 2025-07-01 DIAGNOSIS — F32.A DEPRESSION, UNSPECIFIED DEPRESSION TYPE: ICD-10-CM

## 2025-07-01 DIAGNOSIS — Z82.49 FAMILY HISTORY OF HEART DISEASE: ICD-10-CM

## 2025-07-01 DIAGNOSIS — Z00.00 ANNUAL PHYSICAL EXAM: Primary | ICD-10-CM

## 2025-07-01 DIAGNOSIS — Z00.6 ENCOUNTER FOR EXAMINATION FOR NORMAL COMPARISON OR CONTROL IN CLINICAL RESEARCH PROGRAM: ICD-10-CM

## 2025-07-01 DIAGNOSIS — F40.243 FEAR OF FLYING: ICD-10-CM

## 2025-07-01 PROCEDURE — 99213 OFFICE O/P EST LOW 20 MIN: CPT | Performed by: FAMILY MEDICINE

## 2025-07-01 PROCEDURE — 36415 COLL VENOUS BLD VENIPUNCTURE: CPT

## 2025-07-01 RX ORDER — ALPRAZOLAM 0.5 MG
0.5 TABLET ORAL 3 TIMES DAILY PRN
Qty: 60 TABLET | Refills: 1 | Status: SHIPPED | OUTPATIENT
Start: 2025-07-01

## 2025-07-01 NOTE — ASSESSMENT & PLAN NOTE
Doing well on Wellbutrin  mg once daily.  Continue same.  Refills are not necessary at this time.  Reevaluate in December

## 2025-07-01 NOTE — ASSESSMENT & PLAN NOTE
Refill provided of as needed alprazolam    Prior to prescribing the controlled substance, a patient search was performed on the [place] prescription drug monitoring program web site.  There was no evidence of diversion or misuse.  Prescription provided

## 2025-07-01 NOTE — PROGRESS NOTES
"  Subjective:      Patient ID: Quinn Carey is a 49 y.o. female.    49-year-old female presents for follow-up in regards to depression and fear flying.  Patient did have screening blood work done in December which I had ordered in September.  Some issues with coverage for her labs but they were normal.  Generally she feels well.  She is a little anxious as they will be flying to [facility] which is a 9-hour flight and then taking a ferry boat for a 5-hour trip to arrive at their destination.  She does not have a fear of flying.  Needs refill of as needed alprazolam especially from this trip.        Past Medical History[1]    Family History[2]    Past Surgical History[3]     reports that she has never smoked. She has never used smokeless tobacco. She reports current alcohol use of about 4.0 standard drinks of alcohol per week. She reports that she does not use drugs.    Current Medications[4]    The following portions of the patient's history were reviewed and updated as appropriate: allergies, current medications, past family history, past medical history, past social history, past surgical history and problem list.    Review of Systems   Constitutional: Negative.    HENT: Negative.     Eyes: Negative.    Respiratory: Negative.     Cardiovascular: Negative.    Gastrointestinal: Negative.    Endocrine: Negative.    Genitourinary: Negative.    Musculoskeletal: Negative.    Skin: Negative.    Allergic/Immunologic: Negative.    Neurological: Negative.    Hematological: Negative.    Psychiatric/Behavioral: Negative.             Objective:    /74   Pulse 76   Ht 5' 2\" (1.575 m)   Wt 52.2 kg (115 lb)   LMP 02/01/2022   SpO2 98%   BMI 21.03 kg/m²      Physical Exam  Vitals and nursing note reviewed.   Constitutional:       Appearance: Normal appearance.     Cardiovascular:      Rate and Rhythm: Normal rate and regular rhythm.     Musculoskeletal:      Cervical back: Normal range of motion and neck supple. "     Neurological:      General: No focal deficit present.      Mental Status: She is alert and oriented to person, place, and time. Mental status is at baseline.     Psychiatric:         Mood and Affect: Mood normal.         Behavior: Behavior normal.         Thought Content: Thought content normal.         Judgment: Judgment normal.           No results found for this or any previous visit (from the past 6 weeks).    Assessment/Plan:    Depression  Doing well on Wellbutrin  mg once daily.  Continue same.  Refills are not necessary at this time.  Reevaluate in December    Fear of flying  Refill provided of as needed alprazolam    Prior to prescribing the controlled substance, a patient search was performed on the [place] prescription drug monitoring program web site.  There was no evidence of diversion or misuse.  Prescription provided          Problem List Items Addressed This Visit        Behavioral Health    Depression    Doing well on Wellbutrin  mg once daily.  Continue same.  Refills are not necessary at this time.  Reevaluate in December         Relevant Medications    ALPRAZolam (XANAX) 0.5 mg tablet    Other Relevant Orders    CBC and differential    Comprehensive metabolic panel    Lipid panel    TSH, 3rd generation with Free T4 reflex    Fear of flying    Refill provided of as needed alprazolam    Prior to prescribing the controlled substance, a patient search was performed on the [place] prescription drug monitoring program web site.  There was no evidence of diversion or misuse.  Prescription provided           Relevant Medications    ALPRAZolam (XANAX) 0.5 mg tablet    Other Relevant Orders    CBC and differential    Comprehensive metabolic panel    Lipid panel    TSH, 3rd generation with Free T4 reflex       Other    Family history of heart disease    Relevant Orders    CBC and differential    Comprehensive metabolic panel    Lipid panel    TSH, 3rd generation with Free T4 reflex    Other Visit Diagnoses       Annual physical exam    -  Primary    Relevant Orders    CBC and differential    Comprehensive metabolic panel    Lipid panel    TSH, 3rd generation with Free T4 reflex                 [1]  Past Medical History:  Diagnosis Date   • Abnormal Pap smear of cervix 1998   • Anxiety    • Depression    • Fibroid 2020   • Obsessive compulsive disorder     RESOLVED 9/4/14   • PMDD (premenstrual dysphoric disorder)    [2]  Family History  Problem Relation Name Age of Onset   • No Known Problems Mother     • Other Father          ACUTE MYOCARDIAL INFARCTION   • No Known Problems Daughter     • No Known Problems Maternal Grandmother     • No Known Problems Maternal Grandfather     • Coronary artery disease Paternal Grandmother Patel medlar    • Stroke Paternal Grandmother Patel medlar    • Heart disease Paternal Grandmother Patel medlar    • No Known Problems Paternal Grandfather     • No Known Problems Maternal Aunt     • No Known Problems Paternal Aunt     [3]  Past Surgical History:  Procedure Laterality Date   • ANKLE SURGERY     • CERVICAL BIOPSY  W/ LOOP ELECTRODE EXCISION  1995   • COLPOSCOPY  05/09/2003    CERVIX WITH BIOPSY(S)   • FRACTURE SURGERY     • MOUTH SURGERY      TOOTH EXTRACTION   • OTHER SURGICAL HISTORY      ENDOCERVICAL CURETTAGE (NOT D&C)   • ID LAPAROSCOPY W/RMVL ADNEXAL STRUCTURES Bilateral 2/9/2022    Procedure: SALPINGECTOMY, LAPAROSCOPIC;  Surgeon: Rosey Burgess MD;  Location: [facility];  Service: Gynecology   • ID LAPS W/VAG HYSTERECT 250 GM/&RMVL TUBE&/OVARIES N/A 2/9/2022    Procedure: HYSTERECTOMY LAPAROSCOPIC ASSISTED VAGINAL (LAVH);  Surgeon: Rosey Burgess MD;  Location: [facility];  Service: Gynecology   [4]    Current Outpatient Medications:   •  ALPRAZolam (XANAX) 0.5 mg tablet, Take 1 tablet (0.5 mg total) by mouth 3 (three) times a day as needed for anxiety, Disp: 60 tablet, Rfl: 1  •  Wellbutrin  MG 24 hr tablet, Take 1 tablet (300 mg total) by mouth in  the morning, Disp: 90 tablet, Rfl: 3  •  acyclovir (ZOVIRAX) 200 mg capsule, Take by mouth every 4 (four) hours while awake   (Patient not taking: Reported on 7/1/2025), Disp: , Rfl:

## 2025-07-15 LAB
APOB+LDLR+PCSK9 GENE MUT ANL BLD/T: NOT DETECTED
BRCA1+BRCA2 DEL+DUP + FULL MUT ANL BLD/T: NOT DETECTED
MLH1+MSH2+MSH6+PMS2 GN DEL+DUP+FUL M: NOT DETECTED

## 2025-08-05 ENCOUNTER — VBI (OUTPATIENT)
Dept: ADMINISTRATIVE | Facility: OTHER | Age: 50
End: 2025-08-05

## (undated) DEVICE — IRRIG ENDO FLO TUBING

## (undated) DEVICE — MAXI PAD5.51 X 13.78 IN. (14.0 X 35.0 CM)HEAVYCONTOUREDUNSCENTED: Brand: CURITY

## (undated) DEVICE — SUT VICRYL 4-0 PS-2 27 IN J426H

## (undated) DEVICE — ANTI-FOG SOLUTION WITH FOAM PAD: Brand: DEVON

## (undated) DEVICE — LIGHT GLOVE GREEN

## (undated) DEVICE — DRAPE SHEET THREE QUARTER

## (undated) DEVICE — PERI/GYN PACK: Brand: CONVERTORS

## (undated) DEVICE — ENSEAL X1 TISSUE SEALER, CURVED JAW, 37 CM SHAFT LENGTH: Brand: ENSEAL

## (undated) DEVICE — TROCAR: Brand: KII® SLEEVE

## (undated) DEVICE — INTENDED FOR TISSUE SEPARATION, AND OTHER PROCEDURES THAT REQUIRE A SHARP SURGICAL BLADE TO PUNCTURE OR CUT.: Brand: BARD-PARKER SAFETY BLADES SIZE 11, STERILE

## (undated) DEVICE — 1820 FOAM BLOCK NEEDLE COUNTER: Brand: DEVON

## (undated) DEVICE — [HIGH FLOW INSUFFLATOR,  DO NOT USE IF PACKAGE IS DAMAGED,  KEEP DRY,  KEEP AWAY FROM SUNLIGHT,  PROTECT FROM HEAT AND RADIOACTIVE SOURCES.]: Brand: PNEUMOSURE

## (undated) DEVICE — LAVH/LAPAROSCOPY DRAPE: Brand: CONVERTORS

## (undated) DEVICE — DISPOSABLE BRIEF/UNDERWEAR

## (undated) DEVICE — Device

## (undated) DEVICE — SUT VICRYL 0 CT-1 CR/8 27IN JJ31G

## (undated) DEVICE — DRAPE UTILITY

## (undated) DEVICE — GLOVE PI ULTRA TOUCH SZ.7.0

## (undated) DEVICE — GENERAL/ PLASTIC TRAY STANDARD: Brand: DEROYAL

## (undated) DEVICE — 3M™ STERI-STRIP™ REINFORCED ADHESIVE SKIN CLOSURES, R1547, 1/2 IN X 4 IN (12 MM X 100 MM), 6 STRIPS/ENVELOPE: Brand: 3M™ STERI-STRIP™

## (undated) DEVICE — IMPERVIOUS SURGICAL GOWN, LG: Brand: CONVERTORS

## (undated) DEVICE — CHLORAPREP HI-LITE 26ML ORANGE

## (undated) DEVICE — INSUFFLATION TUBING PRIMFLO

## (undated) DEVICE — UTERINE MANIPULATOR RUMI 6.7 X 10 CM

## (undated) DEVICE — INTENDED FOR TISSUE SEPARATION, AND OTHER PROCEDURES THAT REQUIRE A SHARP SURGICAL BLADE TO PUNCTURE OR CUT.: Brand: BARD-PARKER ® CARBON RIB-BACK BLADES

## (undated) DEVICE — SCD SEQUENTIAL COMPRESSION COMFORT SLEEVE MEDIUM KNEE LENGTH: Brand: KENDALL SCD

## (undated) DEVICE — SPONGE 4 X 4 XRAY 16 PLY STRL LF RFD

## (undated) DEVICE — GLOVE INDICATOR PI UNDERGLOVE SZ 7 BLUE

## (undated) DEVICE — GLOVE SRG BIOGEL 7

## (undated) DEVICE — TROCAR: Brand: KII FIOS FIRST ENTRY

## (undated) DEVICE — CHLORHEXIDINE 4PCT 4 OZ

## (undated) DEVICE — TRAY FOLEY 16FR URIMETER SILICONE SURESTEP